# Patient Record
Sex: FEMALE | Race: WHITE | NOT HISPANIC OR LATINO | Employment: OTHER | URBAN - NONMETROPOLITAN AREA
[De-identification: names, ages, dates, MRNs, and addresses within clinical notes are randomized per-mention and may not be internally consistent; named-entity substitution may affect disease eponyms.]

---

## 2023-04-05 DIAGNOSIS — K22.70 BARRETT'S ESOPHAGUS WITHOUT DYSPLASIA: Primary | ICD-10-CM

## 2023-04-06 NOTE — TELEPHONE ENCOUNTER
Patient is due for 6 month follow up in April.   I called and spoke with patients spouse. Requested call back from patient

## 2023-04-07 NOTE — TELEPHONE ENCOUNTER
The patient is refusing to schedule at this time. She is going to the emergency room at this time for her arm.

## 2023-04-10 PROBLEM — K22.70 BARRETT'S ESOPHAGUS: Status: ACTIVE | Noted: 2023-04-10

## 2023-04-10 RX ORDER — OMEPRAZOLE 20 MG/1
20 CAPSULE, DELAYED RELEASE ORAL DAILY
Qty: 90 CAPSULE | Refills: 0 | Status: SHIPPED | OUTPATIENT
Start: 2023-04-10 | End: 2023-06-26

## 2023-04-10 RX ORDER — OMEPRAZOLE 20 MG/1
20 CAPSULE, DELAYED RELEASE ORAL DAILY
COMMUNITY
Start: 2016-07-05 | End: 2023-04-26 | Stop reason: ALTCHOICE

## 2023-04-13 ENCOUNTER — TELEPHONE (OUTPATIENT)
Dept: PRIMARY CARE | Facility: CLINIC | Age: 83
End: 2023-04-13
Payer: MEDICARE

## 2023-04-13 DIAGNOSIS — M25.519 ACUTE SHOULDER PAIN, UNSPECIFIED LATERALITY: Primary | ICD-10-CM

## 2023-04-13 RX ORDER — OXYCODONE HYDROCHLORIDE 5 MG/1
5 TABLET ORAL EVERY 6 HOURS PRN
Qty: 6 TABLET | Refills: 0 | Status: SHIPPED | OUTPATIENT
Start: 2023-04-13 | End: 2023-04-15 | Stop reason: SDUPTHER

## 2023-04-13 NOTE — TELEPHONE ENCOUNTER
[place] [facility] [place] a [place] and hurt shoulder calling to sched hosp follow up, no appointments available till Sat with Dr Oneil, pt is scheduled and asking if poss to get a couple hydrocodone called in till her apt on Sat morning?

## 2023-04-14 ENCOUNTER — APPOINTMENT (OUTPATIENT)
Dept: PRIMARY CARE | Facility: CLINIC | Age: 83
End: 2023-04-14
Payer: MEDICARE

## 2023-04-14 NOTE — PROGRESS NOTES
Subjective   Patient ID: Giselle Escobar is a 82 y.o. female who presents for a Hospital discharge follow up (ED x 2)   Son, Gamaliel, is present at today's appointment.     HPI   Location: [facility] ED  Date: 4/7 and 4/11/2023  Reason: Fall, left arm pain  Dx: Left shoulder injury, Fall, Shoulder strain     Patient fell while vacuuming the day prior to 4/11 presentation. Has Afib (takes xarelto). Took Percocet which helped. Did not have LOC or hit head.     Xray LEFT shoulder- arthritic changes, no fracture  Xray Left scapula- no fracture     4/7 Injury: Was moving her cat and had right arm pain after doing this activity. No fall or trauma. Developed pain at right elbow.   Dx: Right elbow pain, right arm pain, elbow sprain  Xray right elbow- OA with ossified bodies and joint effusion  Xray right forearm- no acute findings    She is referred to Ortho, seeing [facility] Dr.Patrick Brothers    Pain is rated as 10/10  Has some numbness and tingling into her hand intermittently.  Right hand dominant    Has been getting cortisone shots in her left shoulder chronically. Last was in the Fall 2022.     She is taking hydrocodone every 4 hours, but only has 2 pills left. She reports she is tolerating this well, no issues. Denies drowsiness or constipation from the medication.   She is also icing her shoulder.    She is doing some shoulder exercises, but has been wearing a sling.  Denies shoulder bruising or swelling    Review of Systems  See HPI    Objective   /75 (BP Location: Right arm, Patient Position: Sitting, BP Cuff Size: Adult)   Pulse 90   Temp 36.7 °C (98 °F) (Temporal)   Resp 16   Wt 75.8 kg (167 lb 1.6 oz)   SpO2 95%   BMI 29.60 kg/m²     Physical Exam  Musculoskeletal:      Left shoulder: Tenderness present. No swelling, effusion or crepitus. Decreased range of motion. Decreased strength. Normal pulse.      Comments: Left arm in sling, patient can passively flex shoulder and internally/externally rotate but  "can't actively perform these motions due to pain  Normal  strength  No ecchymosis or skin abnormalities appreciated        Constitutional: Well developed, well nourished, alert and in no acute distress   Eyes: Normal external exam.    Cardiovascular: Regular rate and rhythm, normal S1 and S2, no murmurs, gallops, or rubs. Radial pulses normal. No peripheral edema.  Pulmonary: No respiratory distress, lungs clear to auscultation bilaterally. No wheezes, rhonchi, rales.  Skin: Warm, well perfused, normal skin turgor and color.   Neurologic: Cranial nerves II-XII grossly intact.   Psychiatric: Mood calm and affect normal.      Assessment/Plan   ED records from 4/7 and 4/11 reviewed     [phone] [facility]    I personally have reviewed the RRS report for this patient.  This report is scanned into the electronic medical record.  I have considered the risks of abuse, dependence, addiction and diversion.  I believe that it is clinically appropriate for the patient to be prescribed this medication.    Referral to ortho placed, order given to the patient    We discussed the risks and potential adverse effects of the opiate pain medication and that this should be used sparingly. It may cause drowsiness and constipation.     Continue to do gentle arm stretches, we discussed avoiding \"frozen shoulder\"       "

## 2023-04-15 ENCOUNTER — OFFICE VISIT (OUTPATIENT)
Dept: PRIMARY CARE | Facility: CLINIC | Age: 83
End: 2023-04-15
Payer: MEDICARE

## 2023-04-15 VITALS
OXYGEN SATURATION: 95 % | TEMPERATURE: 98 F | HEART RATE: 90 BPM | WEIGHT: 167.1 LBS | BODY MASS INDEX: 29.6 KG/M2 | SYSTOLIC BLOOD PRESSURE: 131 MMHG | DIASTOLIC BLOOD PRESSURE: 75 MMHG | RESPIRATION RATE: 16 BRPM

## 2023-04-15 DIAGNOSIS — G89.29 CHRONIC LEFT SHOULDER PAIN: ICD-10-CM

## 2023-04-15 DIAGNOSIS — E66.3 OVERWEIGHT WITH BODY MASS INDEX (BMI) OF 29 TO 29.9 IN ADULT: ICD-10-CM

## 2023-04-15 DIAGNOSIS — M25.519 ACUTE SHOULDER PAIN, UNSPECIFIED LATERALITY: ICD-10-CM

## 2023-04-15 DIAGNOSIS — Z09 HOSPITAL DISCHARGE FOLLOW-UP: Primary | ICD-10-CM
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
## 2023-04-15 DIAGNOSIS — M25.512 CHRONIC LEFT SHOULDER PAIN: ICD-10-CM

## 2023-04-15 PROCEDURE — 1160F RVW MEDS BY RX/DR IN RCRD: CPT | Performed by: FAMILY MEDICINE

## 2023-04-15 PROCEDURE — 99214 OFFICE O/P EST MOD 30 MIN: CPT | Performed by: FAMILY MEDICINE

## 2023-04-15 PROCEDURE — 1159F MED LIST DOCD IN RCRD: CPT | Performed by: FAMILY MEDICINE

## 2023-04-15 PROCEDURE — 1036F TOBACCO NON-USER: CPT | Performed by: FAMILY MEDICINE

## 2023-04-15 RX ORDER — MULTIVITAMIN
TABLET ORAL
COMMUNITY
Start: 2009-06-08

## 2023-04-15 RX ORDER — FUROSEMIDE 20 MG/1
20 TABLET ORAL EVERY 12 HOURS
COMMUNITY
Start: 2022-06-09 | End: 2023-11-07 | Stop reason: ALTCHOICE

## 2023-04-15 RX ORDER — ASCORBIC ACID 500 MG
500 TABLET,CHEWABLE ORAL DAILY
COMMUNITY

## 2023-04-15 RX ORDER — LOSARTAN POTASSIUM 50 MG/1
TABLET ORAL EVERY 24 HOURS
COMMUNITY
Start: 2022-06-09 | End: 2023-06-26

## 2023-04-15 RX ORDER — CALCITRIOL 0.25 UG/1
CAPSULE ORAL
COMMUNITY
End: 2023-04-26 | Stop reason: SDUPTHER

## 2023-04-15 RX ORDER — BUDESONIDE 0.5 MG/2ML
0.5 INHALANT ORAL 2 TIMES DAILY
Qty: 120 ML | Refills: 11 | COMMUNITY
Start: 2022-08-01 | End: 2023-11-07 | Stop reason: ALTCHOICE

## 2023-04-15 RX ORDER — MULTIVITAMIN
TABLET ORAL EVERY 24 HOURS
COMMUNITY

## 2023-04-15 RX ORDER — MONTELUKAST SODIUM 10 MG/1
1 TABLET ORAL NIGHTLY
COMMUNITY
Start: 2022-05-20 | End: 2023-04-26 | Stop reason: ALTCHOICE

## 2023-04-15 RX ORDER — OXYCODONE HYDROCHLORIDE 5 MG/1
5 TABLET ORAL EVERY 6 HOURS PRN
Qty: 16 TABLET | Refills: 0 | Status: SHIPPED | OUTPATIENT
Start: 2023-04-15 | End: 2023-04-26 | Stop reason: ALTCHOICE

## 2023-04-15 RX ORDER — MAGNESIUM 250 MG
250 TABLET ORAL
COMMUNITY

## 2023-04-15 RX ORDER — OXYCODONE AND ACETAMINOPHEN 5; 325 MG/1; MG/1
TABLET ORAL
COMMUNITY
Start: 2023-04-07 | End: 2023-04-26 | Stop reason: ALTCHOICE

## 2023-04-15 ASSESSMENT — PAIN SCALES - GENERAL: PAINLEVEL: 10-WORST PAIN EVER

## 2023-04-15 NOTE — PATIENT INSTRUCTIONS
ED records from 4/7 and 4/11 reviewed     [phone] [facility]    I personally have reviewed the OARRS report for this patient.  This report is scanned into the electronic medical record.  I have considered the risks of abuse, dependence, addiction and diversion.  I believe that it is clinically appropriate for the patient to be prescribed this medication.    Referral to ortho placed, order given to the patient    We discussed the risks and potential adverse effects of the opiate pain medication and that this should be used sparingly. It may cause drowsiness and constipation.

## 2023-04-25 PROBLEM — M25.539 WRIST PAIN: Status: ACTIVE | Noted: 2023-04-25

## 2023-04-25 PROBLEM — E66.811 OBESITY, CLASS I, BMI 30-34.9: Status: ACTIVE | Noted: 2022-05-20

## 2023-04-25 PROBLEM — M19.012 GLENOHUMERAL ARTHRITIS, LEFT: Status: ACTIVE | Noted: 2023-04-25

## 2023-04-25 PROBLEM — N18.30 STAGE 3 CHRONIC KIDNEY DISEASE (MULTI): Status: RESOLVED | Noted: 2023-04-25 | Resolved: 2023-04-25

## 2023-04-25 PROBLEM — N18.30 STAGE 3 CHRONIC KIDNEY DISEASE (MULTI): Status: ACTIVE | Noted: 2023-04-25

## 2023-04-25 PROBLEM — L03.90 WOUND CELLULITIS: Status: RESOLVED | Noted: 2023-04-25 | Resolved: 2023-04-25

## 2023-04-25 PROBLEM — T14.90XA TRAUMA: Status: RESOLVED | Noted: 2020-12-02 | Resolved: 2023-04-25

## 2023-04-25 PROBLEM — N25.81 HYPERPARATHYROIDISM DUE TO RENAL INSUFFICIENCY (MULTI): Status: ACTIVE | Noted: 2023-04-25

## 2023-04-25 PROBLEM — N18.4 STAGE 4 CHRONIC KIDNEY DISEASE (MULTI): Status: ACTIVE | Noted: 2023-04-25

## 2023-04-25 PROBLEM — E83.42 HYPOMAGNESEMIA: Status: ACTIVE | Noted: 2023-04-25

## 2023-04-25 PROBLEM — S80.812A ABRASION OF LEFT LOWER LEG WITH INFECTION: Status: ACTIVE | Noted: 2022-05-17

## 2023-04-25 PROBLEM — D64.9 CHRONIC ANEMIA: Status: ACTIVE | Noted: 2023-04-25

## 2023-04-25 PROBLEM — M85.80 OSTEOPENIA: Status: ACTIVE | Noted: 2023-04-25

## 2023-04-25 PROBLEM — H61.22 IMPACTED CERUMEN OF LEFT EAR: Status: ACTIVE | Noted: 2023-04-25

## 2023-04-25 PROBLEM — J45.909 ALLERGY-INDUCED ASTHMA (HHS-HCC): Status: ACTIVE | Noted: 2023-04-25

## 2023-04-25 PROBLEM — H91.90 HEARING DIFFICULTY: Status: ACTIVE | Noted: 2023-04-25

## 2023-04-25 PROBLEM — R60.0 BILATERAL EDEMA OF LOWER EXTREMITY: Status: ACTIVE | Noted: 2023-04-25

## 2023-04-25 PROBLEM — M25.512 PAIN IN LEFT SHOULDER: Status: ACTIVE | Noted: 2023-04-25

## 2023-04-25 PROBLEM — I27.0 PRIMARY PULMONARY HYPERTENSION (MULTI): Status: ACTIVE | Noted: 2023-04-25

## 2023-04-25 PROBLEM — H61.22 IMPACTED CERUMEN OF LEFT EAR: Status: RESOLVED | Noted: 2023-04-25 | Resolved: 2023-04-25

## 2023-04-25 PROBLEM — K44.9 HIATAL HERNIA: Status: ACTIVE | Noted: 2023-04-25

## 2023-04-25 PROBLEM — I25.10 CAD (CORONARY ARTERY DISEASE): Status: ACTIVE | Noted: 2023-04-25

## 2023-04-25 PROBLEM — I07.1 TRICUSPID REGURGITATION: Status: ACTIVE | Noted: 2023-04-25

## 2023-04-25 PROBLEM — I65.29 CAROTID ARTERY OCCLUSION: Status: ACTIVE | Noted: 2023-04-25

## 2023-04-25 PROBLEM — R53.83 FATIGUE: Status: ACTIVE | Noted: 2023-04-25

## 2023-04-25 PROBLEM — S49.90XA SHOULDER INJURY: Status: ACTIVE | Noted: 2023-04-25

## 2023-04-25 PROBLEM — L03.90 WOUND CELLULITIS: Status: ACTIVE | Noted: 2023-04-25

## 2023-04-25 PROBLEM — L03.116 LEFT LEG CELLULITIS: Status: ACTIVE | Noted: 2022-05-18

## 2023-04-25 PROBLEM — R05.3 CHRONIC COUGH: Status: ACTIVE | Noted: 2022-05-18

## 2023-04-25 PROBLEM — I12.9 HYPERTENSION WITH RENAL DISEASE: Status: ACTIVE | Noted: 2023-04-25

## 2023-04-25 PROBLEM — R60.9 EDEMA: Status: ACTIVE | Noted: 2023-04-25

## 2023-04-25 PROBLEM — E66.811 OBESITY, CLASS I, BMI 30-34.9: Status: RESOLVED | Noted: 2022-05-20 | Resolved: 2023-04-25

## 2023-04-25 PROBLEM — R53.83 FATIGUE: Status: RESOLVED | Noted: 2023-04-25 | Resolved: 2023-04-25

## 2023-04-25 PROBLEM — I12.9 HYPERTENSIVE NEPHROSCLEROSIS: Status: ACTIVE | Noted: 2023-04-25

## 2023-04-25 PROBLEM — S63.8X2A TEAR OF LEFT SCAPHOLUNATE LIGAMENT: Status: ACTIVE | Noted: 2023-04-25

## 2023-04-25 PROBLEM — J45.909 REACTIVE AIRWAY DISEASE WITHOUT COMPLICATION (HHS-HCC): Status: ACTIVE | Noted: 2022-05-20

## 2023-04-25 PROBLEM — I35.0 AORTIC STENOSIS, MILD: Status: ACTIVE | Noted: 2023-04-25

## 2023-04-25 PROBLEM — T14.90XA TRAUMA: Status: ACTIVE | Noted: 2020-12-02

## 2023-04-25 PROBLEM — E66.9 OBESITY, CLASS I, BMI 30-34.9: Status: ACTIVE | Noted: 2022-05-20

## 2023-04-25 PROBLEM — S59.909A ELBOW INJURY: Status: ACTIVE | Noted: 2023-04-25

## 2023-04-25 PROBLEM — E66.9 OBESITY (BMI 30-39.9): Status: ACTIVE | Noted: 2023-04-25

## 2023-04-25 PROBLEM — L08.9 ABRASION OF LEFT LOWER LEG WITH INFECTION: Status: ACTIVE | Noted: 2022-05-17

## 2023-04-25 PROBLEM — I48.0 PAROXYSMAL ATRIAL FIBRILLATION (MULTI): Status: ACTIVE | Noted: 2022-05-18

## 2023-04-25 PROBLEM — E66.9 OBESITY, CLASS I, BMI 30-34.9: Status: RESOLVED | Noted: 2022-05-20 | Resolved: 2023-04-25

## 2023-04-25 PROBLEM — M79.643 PAIN OF HAND AFTER TRAUMA: Status: ACTIVE | Noted: 2023-04-25

## 2023-04-25 PROBLEM — D46.9 MYELODYSPLASTIC SYNDROME (MULTI): Status: ACTIVE | Noted: 2023-04-25

## 2023-04-25 RX ORDER — LANOLIN ALCOHOL/MO/W.PET/CERES
100 CREAM (GRAM) TOPICAL DAILY
COMMUNITY

## 2023-04-25 RX ORDER — IPRATROPIUM BROMIDE AND ALBUTEROL SULFATE 2.5; .5 MG/3ML; MG/3ML
SOLUTION RESPIRATORY (INHALATION)
COMMUNITY
Start: 2022-08-17 | End: 2023-11-07 | Stop reason: ALTCHOICE

## 2023-04-25 RX ORDER — ASCORBIC ACID 500 MG
500 TABLET ORAL DAILY
COMMUNITY
End: 2023-04-26 | Stop reason: ALTCHOICE

## 2023-04-25 NOTE — PROGRESS NOTES
Subjective   Reason for Visit: Giselle Escobar is an 82 y.o. female here for a Medicare Wellness visit.     Past Medical, Surgical, and Family History reviewed and updated in chart.    Reviewed all medications by prescribing practitioner or clinical pharmacist (such as prescriptions, OTCs, herbal therapies and supplements) and documented in the medical record.    HPI    Here for follow up-      A fib, CAD- follows with cardiology- Dr. De La Rosa/Shabnam Carranza- she is on xarelto. No bleeding concerns. Note from 10/7/22 visit reviewed. She has refused to take metoprolol.  Scheduled 5/8/23 for follow up.    Cath 2017- recommend medical management of CAD   She was on a statin years ago and stopped it due to myalgias   No hx MI or CHF   Some LE edema, L>R   Only uses lasix if her legs start swelling    Echo showed normal EF, patient in A fib, mild to moderate TR noted - June 2022      Has CKD stage 4- Dr. Quentin Marte - on losartan   She tries to use OTC compression stockings but has difficulty tolerating them, tried prescription ones in the past without tolerance   Has known secondary hyperparathyroidism and is on calcitriol   She is following up with one of his partners this next month      Hematology- Dr. Macdonald- MDS- chronic anemia- Hb ~11- last seen 10/4/22  Takes b12, vit D, calcium, multivitamin      GERD, hx Barretts- EGD 2022 neg - Dr. Rojas - on omeprazole     HTN- losartan- well controlled   Not checking at home      Osteopenia- DEXA 2019  Taking calcium and vit D     She recently saw Dr. Oneil for ER follow up x 2 - she fell and injured her shoulder and LUE   Has known Advanced L GH OA, L acromioclavicular OA  Xray shoulder was neg for fracture  She saw Dr. Noguera (ortho) on 4/18 - diagnosed her with wrist sprain, braced it, referred her to OT ; dx tear of left scapholunate ligament   She then saw Dr. Nolasco (ortho) on 4/21- had L shoulder injection and referred her to PT  She then saw Dr. Reid (ortho) 4/24- gave her  "medrol dose pack   She continues to ice it ; continues to have some to have swelling of wrist and hand and goes up arm (has improved), no redness or warmth  Most of pain in wrist but also shoots up arm and is in shoulder  Steroids are helping and she is not currently taking narcotics.  Pain now 4/10.  She is not sleeping well at night due to pain and would like a short course of percocet.    Son brought her today.   Starts PT Thursday.         Patient Care Team:  Makenna Zafar DO as PCP - General  [facility] JESSICA Macdonald MD as Consulting Physician (Hematology and Oncology)  Stevenson Mcnally MD as Consulting Physician (Nephrology)  Johnson Rojas MD as Referring Physician (Gastroenterology)  Dev De La Rosa MD as Consulting Physician (Cardiology)     Review of Systems   Constitutional:  Negative for chills, fatigue and fever.   HENT:  Negative for congestion, ear pain and sore throat.    Eyes:  Negative for visual disturbance.   Respiratory:  Negative for cough and shortness of breath.    Cardiovascular:  Negative for chest pain, palpitations and leg swelling.   Gastrointestinal:  Negative for abdominal pain, constipation, diarrhea, nausea and vomiting.   Genitourinary: Negative.    Musculoskeletal:  Positive for arthralgias and myalgias.   Skin:  Negative for rash.   Neurological:  Negative for dizziness, weakness, numbness and headaches.   Psychiatric/Behavioral: Negative.         Objective   Vitals:  /79 (BP Location: Right arm, Patient Position: Sitting, BP Cuff Size: Adult)   Pulse 73   Temp 36.3 °C (97.3 °F) (Temporal)   Resp 16   Ht 1.613 m (5' 3.5\")   Wt 79.1 kg (174 lb 6.4 oz)   SpO2 95%   BMI 30.41 kg/m²       Physical Exam    Constitutional: Well developed, well nourished, alert and in no acute distress.  Head and Face: Normocephalic, atraumatic.  Eyes: Normal external exam. Pupils equally round and reactive to light with normal accommodation and extraocular movements intact. "   ENT: External inspection of ears normal, tympanic membranes visualized and normal.   Neck: Supple, no lymphadenopathy or masses.   Cardiovascular: Irregular rate and rhythm, normal S1 and S2, no murmurs, gallops, or rubs.  Bilateral symmetric 1+ peripheral edema to mid calf. No carotid bruits.   Pulmonary: No respiratory distress, lungs clear to auscultation bilaterally. No wheezes, rhonchi, rales.   SHOULDER: Normal visual inspection, no erythema, warmth, or swelling. Severely reduced range of motion, can abduct to about 30 degrees.    Hands: Some L wrist swelling present without erythema or warmth. Sensation intact. Radial pulses intact. ROM mildly reduced.    Skin: Warm, well perfused, normal skin turgor and color, no lesions or rashes noted.   Neurologic: Cranial nerves II-XII grossly intact.  Psychiatric: Mood calm and affect normal.      Assessment/Plan   Problem List Items Addressed This Visit          Circulatory    CAD (coronary artery disease)    Current Assessment & Plan     Follow up with cardiology   Statin intolerant   On xarelto for A fib          Essential hypertension, benign    Current Assessment & Plan     Controlled, continue losartan          Paroxysmal atrial fibrillation (CMS/[facility])    Current Assessment & Plan     Continue xarelto for stroke prevention             Digestive    Brunson's esophagus    Overview     EGD 7/6/22         Current Assessment & Plan     Continue omeprazole - Dr. Rojas          Gastroesophageal reflux disease       Genitourinary    Stage 4 chronic kidney disease (CMS/[facility])    Current Assessment & Plan     Follows with Nephrology - labs ordered today          Relevant Medications    calcitriol (Rocaltrol) 0.25 mcg capsule    Other Relevant Orders    Basic Metabolic Panel    Parathyroid Hormone, Intact    Vitamin D, Total    Phosphorus       Musculoskeletal    Glenohumeral arthritis, left    Osteopenia    Overview     5/14/19 dexa         Current Assessment & Plan     Bone  health support: Make sure you are getting at least 1,200 mg daily of calcium (preferred via dietary intake, okay for supplements if needed), and 2,000 IU of vitamin D3 daily.   Encourage weight bearing exercise as able, along with balance and strength training  Reduce fall risk in the home           Pain in left shoulder    Current Assessment & Plan     Acute on chronic pain, underlying severe GH OA with recent fall  S/p L shoulder injection with ortho   Currently on medrol dose pack as well   Starting PT this week   Advised her I would refill the percocet short term only, no refills to be given, so she can get some sleep at night  Plan reviewed and patient indicates understanding of plan of care and medication use  I discussed proper medication secure storage and disposal of unused medications with the patient.   It is unsafe and unlawful to give away or sell opioid medication.   Patient counseled of risks of opioids including but not limited to sedation, drowsiness, irregular heart rate, constipation, nausea, itching, vomiting, dizziness, slowing of breathing rate, slowing of reaction time, increased sensitivity to pain, decreases in hormone levels in body, physical dependence, tolerance to medication, addiction, depression, and death. Patient states understanding of these risks and wishes to proceed with treatment. Advised no concurrent alcohol use or sleep aids with this medication. Do not use before driving.  An OARRS report, the list of controlled substances dispensed for the patient, has been updated according to requirements from the [facility] and is concordant.            Pain of hand after trauma    Wrist pain    Shoulder injury    Relevant Medications    oxyCODONE-acetaminophen (Percocet) 5-325 mg tablet    Tear of left scapholunate ligament       Endocrine/Metabolic    Hyperparathyroidism due to renal insufficiency (CMS/[facility])    Class 1 obesity without serious comorbidity with body mass index (BMI) of  30.0 to 30.9 in adult       Hematologic    Chronic anemia       Other    Hyperlipidemia    Myelodysplastic syndrome (CMS/HCC)    Current Assessment & Plan     Follow up with Dr. Macdonald          Relevant Orders    CBC and Auto Differential    Ferritin    Iron and TIBC    Folate    Vitamin B12     Other Visit Diagnoses       Medicare annual wellness visit, subsequent    -  Primary    Screening for depression        Routine general medical examination at health care facility              Makenna Zafar DO  4/26/2023

## 2023-04-26 ENCOUNTER — LAB (OUTPATIENT)
Dept: LAB | Facility: LAB | Age: 83
End: 2023-04-26
Payer: MEDICARE

## 2023-04-26 ENCOUNTER — OFFICE VISIT (OUTPATIENT)
Dept: PRIMARY CARE | Facility: CLINIC | Age: 83
End: 2023-04-26
Payer: MEDICARE

## 2023-04-26 VITALS
SYSTOLIC BLOOD PRESSURE: 131 MMHG | DIASTOLIC BLOOD PRESSURE: 79 MMHG | HEART RATE: 73 BPM | OXYGEN SATURATION: 95 % | BODY MASS INDEX: 29.78 KG/M2 | TEMPERATURE: 97.3 F | WEIGHT: 174.4 LBS | HEIGHT: 64 IN | RESPIRATION RATE: 16 BRPM

## 2023-04-26 DIAGNOSIS — S49.92XD INJURY OF LEFT SHOULDER, SUBSEQUENT ENCOUNTER: ICD-10-CM

## 2023-04-26 DIAGNOSIS — M25.532 LEFT WRIST PAIN: ICD-10-CM

## 2023-04-26 DIAGNOSIS — D64.9 CHRONIC ANEMIA: ICD-10-CM

## 2023-04-26 DIAGNOSIS — E66.9 CLASS 1 OBESITY WITHOUT SERIOUS COMORBIDITY WITH BODY MASS INDEX (BMI) OF 30.0 TO 30.9 IN ADULT, UNSPECIFIED OBESITY TYPE: ICD-10-CM

## 2023-04-26 DIAGNOSIS — K21.9 GASTROESOPHAGEAL REFLUX DISEASE, UNSPECIFIED WHETHER ESOPHAGITIS PRESENT: ICD-10-CM

## 2023-04-26 DIAGNOSIS — N25.81 HYPERPARATHYROIDISM DUE TO RENAL INSUFFICIENCY (MULTI): ICD-10-CM

## 2023-04-26 DIAGNOSIS — N18.4 STAGE 4 CHRONIC KIDNEY DISEASE (MULTI): ICD-10-CM

## 2023-04-26 DIAGNOSIS — Z00.00 MEDICARE ANNUAL WELLNESS VISIT, SUBSEQUENT: Primary | ICD-10-CM

## 2023-04-26 DIAGNOSIS — I25.10 CORONARY ARTERY DISEASE INVOLVING NATIVE CORONARY ARTERY OF NATIVE HEART WITHOUT ANGINA PECTORIS: ICD-10-CM

## 2023-04-26 DIAGNOSIS — Z13.31 SCREENING FOR DEPRESSION: ICD-10-CM

## 2023-04-26 DIAGNOSIS — M79.643 PAIN OF HAND AFTER TRAUMA: ICD-10-CM

## 2023-04-26 DIAGNOSIS — I48.0 PAROXYSMAL ATRIAL FIBRILLATION (MULTI): ICD-10-CM

## 2023-04-26 DIAGNOSIS — M85.80 OSTEOPENIA, UNSPECIFIED LOCATION: ICD-10-CM

## 2023-04-26 DIAGNOSIS — S63.8X2D TEAR OF LEFT SCAPHOLUNATE LIGAMENT, SUBSEQUENT ENCOUNTER: ICD-10-CM

## 2023-04-26 DIAGNOSIS — M19.012 GLENOHUMERAL ARTHRITIS, LEFT: ICD-10-CM

## 2023-04-26 DIAGNOSIS — Z00.00 ROUTINE GENERAL MEDICAL EXAMINATION AT HEALTH CARE FACILITY: ICD-10-CM

## 2023-04-26 DIAGNOSIS — M25.512 CHRONIC LEFT SHOULDER PAIN: ICD-10-CM

## 2023-04-26 DIAGNOSIS — K22.70 BARRETT'S ESOPHAGUS WITHOUT DYSPLASIA: ICD-10-CM

## 2023-04-26 DIAGNOSIS — E78.5 HYPERLIPIDEMIA, UNSPECIFIED HYPERLIPIDEMIA TYPE: ICD-10-CM

## 2023-04-26 DIAGNOSIS — I10 ESSENTIAL HYPERTENSION, BENIGN: ICD-10-CM

## 2023-04-26 DIAGNOSIS — D46.9 MYELODYSPLASTIC SYNDROME (MULTI): ICD-10-CM

## 2023-04-26 DIAGNOSIS — G89.29 CHRONIC LEFT SHOULDER PAIN: ICD-10-CM

## 2023-04-26 PROBLEM — I65.29 CAROTID ARTERY OCCLUSION: Status: RESOLVED | Noted: 2023-04-25 | Resolved: 2023-04-26

## 2023-04-26 PROBLEM — L03.116 LEFT LEG CELLULITIS: Status: RESOLVED | Noted: 2022-05-18 | Resolved: 2023-04-26

## 2023-04-26 PROBLEM — R60.9 EDEMA: Status: RESOLVED | Noted: 2023-04-25 | Resolved: 2023-04-26

## 2023-04-26 PROBLEM — E83.42 HYPOMAGNESEMIA: Status: RESOLVED | Noted: 2023-04-25 | Resolved: 2023-04-26

## 2023-04-26 LAB
BASOPHILS (10*3/UL) IN BLOOD BY AUTOMATED COUNT: 0.02 X10E9/L (ref 0–0.1)
BASOPHILS/100 LEUKOCYTES IN BLOOD BY AUTOMATED COUNT: 0.2 % (ref 0–2)
EOSINOPHILS (10*3/UL) IN BLOOD BY AUTOMATED COUNT: 0.02 X10E9/L (ref 0–0.4)
EOSINOPHILS/100 LEUKOCYTES IN BLOOD BY AUTOMATED COUNT: 0.2 % (ref 0–6)
ERYTHROCYTE DISTRIBUTION WIDTH (RATIO) BY AUTOMATED COUNT: 15.1 % (ref 11.5–14.5)
ERYTHROCYTE MEAN CORPUSCULAR HEMOGLOBIN CONCENTRATION (G/DL) BY AUTOMATED: 29.4 G/DL (ref 32–36)
ERYTHROCYTE MEAN CORPUSCULAR VOLUME (FL) BY AUTOMATED COUNT: 99 FL (ref 80–100)
ERYTHROCYTES (10*6/UL) IN BLOOD BY AUTOMATED COUNT: 3.7 X10E12/L (ref 4–5.2)
HEMATOCRIT (%) IN BLOOD BY AUTOMATED COUNT: 36.7 % (ref 36–46)
HEMOGLOBIN (G/DL) IN BLOOD: 10.8 G/DL (ref 12–16)
IMMATURE GRANULOCYTES/100 LEUKOCYTES IN BLOOD BY AUTOMATED COUNT: 0.6 % (ref 0–0.9)
LEUKOCYTES (10*3/UL) IN BLOOD BY AUTOMATED COUNT: 12.8 X10E9/L (ref 4.4–11.3)
LYMPHOCYTES (10*3/UL) IN BLOOD BY AUTOMATED COUNT: 2.07 X10E9/L (ref 0.8–3)
LYMPHOCYTES/100 LEUKOCYTES IN BLOOD BY AUTOMATED COUNT: 16.1 % (ref 13–44)
MONOCYTES (10*3/UL) IN BLOOD BY AUTOMATED COUNT: 0.95 X10E9/L (ref 0.05–0.8)
MONOCYTES/100 LEUKOCYTES IN BLOOD BY AUTOMATED COUNT: 7.4 % (ref 2–10)
NEUTROPHILS (10*3/UL) IN BLOOD BY AUTOMATED COUNT: 9.69 X10E9/L (ref 1.6–5.5)
NEUTROPHILS/100 LEUKOCYTES IN BLOOD BY AUTOMATED COUNT: 75.5 % (ref 40–80)
NRBC (PER 100 WBCS) BY AUTOMATED COUNT: 0 /100 WBC (ref 0–0)
PLATELETS (10*3/UL) IN BLOOD AUTOMATED COUNT: 421 X10E9/L (ref 150–450)

## 2023-04-26 PROCEDURE — G0444 DEPRESSION SCREEN ANNUAL: HCPCS | Performed by: FAMILY MEDICINE

## 2023-04-26 PROCEDURE — 82728 ASSAY OF FERRITIN: CPT

## 2023-04-26 PROCEDURE — 82746 ASSAY OF FOLIC ACID SERUM: CPT

## 2023-04-26 PROCEDURE — 1036F TOBACCO NON-USER: CPT | Performed by: FAMILY MEDICINE

## 2023-04-26 PROCEDURE — 3078F DIAST BP <80 MM HG: CPT | Performed by: FAMILY MEDICINE

## 2023-04-26 PROCEDURE — G0439 PPPS, SUBSEQ VISIT: HCPCS | Performed by: FAMILY MEDICINE

## 2023-04-26 PROCEDURE — 1159F MED LIST DOCD IN RCRD: CPT | Performed by: FAMILY MEDICINE

## 2023-04-26 PROCEDURE — 80048 BASIC METABOLIC PNL TOTAL CA: CPT

## 2023-04-26 PROCEDURE — 1170F FXNL STATUS ASSESSED: CPT | Performed by: FAMILY MEDICINE

## 2023-04-26 PROCEDURE — 83540 ASSAY OF IRON: CPT

## 2023-04-26 PROCEDURE — 3075F SYST BP GE 130 - 139MM HG: CPT | Performed by: FAMILY MEDICINE

## 2023-04-26 PROCEDURE — 83970 ASSAY OF PARATHORMONE: CPT

## 2023-04-26 PROCEDURE — 82607 VITAMIN B-12: CPT

## 2023-04-26 PROCEDURE — 84100 ASSAY OF PHOSPHORUS: CPT

## 2023-04-26 PROCEDURE — 83550 IRON BINDING TEST: CPT

## 2023-04-26 PROCEDURE — 85025 COMPLETE CBC W/AUTO DIFF WBC: CPT

## 2023-04-26 PROCEDURE — 99214 OFFICE O/P EST MOD 30 MIN: CPT | Performed by: FAMILY MEDICINE

## 2023-04-26 PROCEDURE — 82306 VITAMIN D 25 HYDROXY: CPT

## 2023-04-26 PROCEDURE — 1160F RVW MEDS BY RX/DR IN RCRD: CPT | Performed by: FAMILY MEDICINE

## 2023-04-26 PROCEDURE — 36415 COLL VENOUS BLD VENIPUNCTURE: CPT

## 2023-04-26 RX ORDER — OXYCODONE AND ACETAMINOPHEN 5; 325 MG/1; MG/1
1 TABLET ORAL EVERY 8 HOURS PRN
Qty: 15 TABLET | Refills: 0 | Status: SHIPPED | OUTPATIENT
Start: 2023-04-26 | End: 2023-04-26 | Stop reason: SDUPTHER

## 2023-04-26 RX ORDER — CALCITRIOL 0.25 UG/1
CAPSULE ORAL
Qty: 45 CAPSULE | Refills: 3 | Status: SHIPPED | OUTPATIENT
Start: 2023-04-26

## 2023-04-26 RX ORDER — CALCITRIOL 0.25 UG/1
CAPSULE ORAL
Qty: 45 CAPSULE | Refills: 3 | Status: SHIPPED | OUTPATIENT
Start: 2023-04-26 | End: 2023-04-26 | Stop reason: SDUPTHER

## 2023-04-26 RX ORDER — OXYCODONE AND ACETAMINOPHEN 5; 325 MG/1; MG/1
1 TABLET ORAL EVERY 8 HOURS PRN
Qty: 15 TABLET | Refills: 0 | Status: SHIPPED | OUTPATIENT
Start: 2023-04-26 | End: 2023-09-11 | Stop reason: WASHOUT

## 2023-04-26 ASSESSMENT — LIFESTYLE VARIABLES
SKIP TO QUESTIONS 9-10: 1
HOW MANY STANDARD DRINKS CONTAINING ALCOHOL DO YOU HAVE ON A TYPICAL DAY: PATIENT DOES NOT DRINK
HOW OFTEN DURING THE LAST YEAR HAVE YOU BEEN UNABLE TO REMEMBER WHAT HAPPENED THE NIGHT BEFORE BECAUSE YOU HAD BEEN DRINKING: NEVER
HOW OFTEN DURING THE LAST YEAR HAVE YOU NEEDED AN ALCOHOLIC DRINK FIRST THING IN THE MORNING TO GET YOURSELF GOING AFTER A NIGHT OF HEAVY DRINKING: NEVER
AUDIT-C TOTAL SCORE: 0
HOW OFTEN DURING THE LAST YEAR HAVE YOU HAD A FEELING OF GUILT OR REMORSE AFTER DRINKING: NEVER
HOW OFTEN DO YOU HAVE SIX OR MORE DRINKS ON ONE OCCASION: NEVER
AUDIT TOTAL SCORE: 0
HOW OFTEN DURING THE LAST YEAR HAVE YOU FAILED TO DO WHAT WAS NORMALLY EXPECTED FROM YOU BECAUSE OF DRINKING: NEVER
HAS A RELATIVE, FRIEND, DOCTOR, OR ANOTHER HEALTH PROFESSIONAL EXPRESSED CONCERN ABOUT YOUR DRINKING OR SUGGESTED YOU CUT DOWN: NO
HOW OFTEN DO YOU HAVE A DRINK CONTAINING ALCOHOL: NEVER
HOW OFTEN DURING THE LAST YEAR HAVE YOU FOUND THAT YOU WERE NOT ABLE TO STOP DRINKING ONCE YOU HAD STARTED: NEVER
HAVE YOU OR SOMEONE ELSE BEEN INJURED AS A RESULT OF YOUR DRINKING: NO

## 2023-04-26 ASSESSMENT — ENCOUNTER SYMPTOMS
VOMITING: 0
NAUSEA: 0
ARTHRALGIAS: 1
COUGH: 0
FEVER: 0
DIZZINESS: 0
DIARRHEA: 0
ABDOMINAL PAIN: 0
HEADACHES: 0
WEAKNESS: 0
CHILLS: 0
CONSTIPATION: 0
MYALGIAS: 1
SORE THROAT: 0
PALPITATIONS: 0
NUMBNESS: 0
SHORTNESS OF BREATH: 0
FATIGUE: 0
PSYCHIATRIC NEGATIVE: 1

## 2023-04-26 ASSESSMENT — ACTIVITIES OF DAILY LIVING (ADL)
BATHING: NEEDS ASSISTANCE
DRESSING: NEEDS ASSISTANCE
GROCERY_SHOPPING: NEEDS ASSISTANCE
MANAGING_FINANCES: INDEPENDENT
TAKING_MEDICATION: INDEPENDENT
DOING_HOUSEWORK: NEEDS ASSISTANCE

## 2023-04-26 ASSESSMENT — PATIENT HEALTH QUESTIONNAIRE - PHQ9
2. FEELING DOWN, DEPRESSED OR HOPELESS: NOT AT ALL
1. LITTLE INTEREST OR PLEASURE IN DOING THINGS: NOT AT ALL
SUM OF ALL RESPONSES TO PHQ9 QUESTIONS 1 AND 2: 0

## 2023-04-26 NOTE — ASSESSMENT & PLAN NOTE
Acute on chronic pain, underlying severe GH OA with recent fall  S/p L shoulder injection with ortho   Currently on medrol dose pack as well   Starting PT this week   Advised her I would refill the percocet short term only, no refills to be given, so she can get some sleep at night  Plan reviewed and patient indicates understanding of plan of care and medication use  I discussed proper medication secure storage and disposal of unused medications with the patient.   It is unsafe and unlawful to give away or sell opioid medication.   Patient counseled of risks of opioids including but not limited to sedation, drowsiness, irregular heart rate, constipation, nausea, itching, vomiting, dizziness, slowing of breathing rate, slowing of reaction time, increased sensitivity to pain, decreases in hormone levels in body, physical dependence, tolerance to medication, addiction, depression, and death. Patient states understanding of these risks and wishes to proceed with treatment. Advised no concurrent alcohol use or sleep aids with this medication. Do not use before driving.  An OARRS report, the list of controlled substances dispensed for the patient, has been updated according to requirements from the [facility] and is concordant.

## 2023-04-26 NOTE — ASSESSMENT & PLAN NOTE
Bone health support: Make sure you are getting at least 1,200 mg daily of calcium (preferred via dietary intake, okay for supplements if needed), and 2,000 IU of vitamin D3 daily.   Encourage weight bearing exercise as able, along with balance and strength training  Reduce fall risk in the home

## 2023-04-27 LAB
ANION GAP IN SER/PLAS: 15 MMOL/L (ref 10–20)
CALCIDIOL (25 OH VITAMIN D3) (NG/ML) IN SER/PLAS: 65 NG/ML
CALCIUM (MG/DL) IN SER/PLAS: 9.6 MG/DL (ref 8.6–10.6)
CARBON DIOXIDE, TOTAL (MMOL/L) IN SER/PLAS: 23 MMOL/L (ref 21–32)
CHLORIDE (MMOL/L) IN SER/PLAS: 107 MMOL/L (ref 98–107)
COBALAMIN (VITAMIN B12) (PG/ML) IN SER/PLAS: >2000 PG/ML (ref 211–911)
CREATININE (MG/DL) IN SER/PLAS: 2.2 MG/DL (ref 0.5–1.05)
FERRITIN (UG/LL) IN SER/PLAS: 117 UG/L (ref 8–150)
FOLATE (NG/ML) IN SER/PLAS: >24 NG/ML
GFR FEMALE: 22 ML/MIN/1.73M2
GLUCOSE (MG/DL) IN SER/PLAS: 95 MG/DL (ref 74–99)
IRON (UG/DL) IN SER/PLAS: 54 UG/DL (ref 35–150)
IRON BINDING CAPACITY (UG/DL) IN SER/PLAS: 294 UG/DL (ref 240–445)
IRON SATURATION (%) IN SER/PLAS: 18 % (ref 25–45)
PARATHYRIN INTACT (PG/ML) IN SER/PLAS: 86.7 PG/ML (ref 18.5–88)
PHOSPHATE (MG/DL) IN SER/PLAS: 3.3 MG/DL (ref 2.5–4.9)
POTASSIUM (MMOL/L) IN SER/PLAS: 4.5 MMOL/L (ref 3.5–5.3)
SODIUM (MMOL/L) IN SER/PLAS: 140 MMOL/L (ref 136–145)
UREA NITROGEN (MG/DL) IN SER/PLAS: 46 MG/DL (ref 6–23)

## 2023-04-28 ENCOUNTER — TELEPHONE (OUTPATIENT)
Dept: PRIMARY CARE | Facility: CLINIC | Age: 83
End: 2023-04-28
Payer: MEDICARE

## 2023-04-28 DIAGNOSIS — G47.00 INSOMNIA, UNSPECIFIED TYPE: Primary | ICD-10-CM

## 2023-04-28 RX ORDER — TRAZODONE HYDROCHLORIDE 50 MG/1
50 TABLET ORAL NIGHTLY PRN
Qty: 30 TABLET | Refills: 0 | Status: SHIPPED | OUTPATIENT
Start: 2023-04-28 | End: 2023-05-31 | Stop reason: SDUPTHER

## 2023-04-28 NOTE — TELEPHONE ENCOUNTER
Patient wanted you to know she has follow up with nephrologist and Dr Macdonald.    She tried the medication you prescribed to help her sleep.  She did not find it helpful.  She tried melatonin last night and was able to get a few hours of sleep but not a significant amount.     She wants to know if there is any other medication you could send in/suggest to help?  She feels extremely worn down because of the lack of sleep.

## 2023-05-12 LAB
ALBUMIN (MG/L) IN URINE: 54.4 MG/L
ALBUMIN/CREATININE (UG/MG) IN URINE: 200.7 UG/MG CRT (ref 0–30)
CREATININE (MG/DL) IN URINE: 27.1 MG/DL (ref 20–320)

## 2023-05-16 LAB
BASOPHILS (10*3/UL) IN BLOOD BY AUTOMATED COUNT: 0.03 X10E9/L (ref 0–0.1)
BASOPHILS/100 LEUKOCYTES IN BLOOD BY AUTOMATED COUNT: 0.3 % (ref 0–2)
EOSINOPHILS (10*3/UL) IN BLOOD BY AUTOMATED COUNT: 0.2 X10E9/L (ref 0–0.4)
EOSINOPHILS/100 LEUKOCYTES IN BLOOD BY AUTOMATED COUNT: 2.2 % (ref 0–6)
ERYTHROCYTE DISTRIBUTION WIDTH (RATIO) BY AUTOMATED COUNT: 17.3 % (ref 11.5–14.5)
ERYTHROCYTE MEAN CORPUSCULAR HEMOGLOBIN CONCENTRATION (G/DL) BY AUTOMATED: 28.7 G/DL (ref 32–36)
ERYTHROCYTE MEAN CORPUSCULAR VOLUME (FL) BY AUTOMATED COUNT: 103 FL (ref 80–100)
ERYTHROCYTES (10*6/UL) IN BLOOD BY AUTOMATED COUNT: 4.15 X10E12/L (ref 4–5.2)
HEMATOCRIT (%) IN BLOOD BY AUTOMATED COUNT: 42.8 % (ref 36–46)
HEMOGLOBIN (G/DL) IN BLOOD: 12.3 G/DL (ref 12–16)
IMMATURE GRANULOCYTES/100 LEUKOCYTES IN BLOOD BY AUTOMATED COUNT: 0.8 % (ref 0–0.9)
LEUKOCYTES (10*3/UL) IN BLOOD BY AUTOMATED COUNT: 9.2 X10E9/L (ref 4.4–11.3)
LYMPHOCYTES (10*3/UL) IN BLOOD BY AUTOMATED COUNT: 2.36 X10E9/L (ref 0.8–3)
LYMPHOCYTES/100 LEUKOCYTES IN BLOOD BY AUTOMATED COUNT: 25.8 % (ref 13–44)
MONOCYTES (10*3/UL) IN BLOOD BY AUTOMATED COUNT: 0.48 X10E9/L (ref 0.05–0.8)
MONOCYTES/100 LEUKOCYTES IN BLOOD BY AUTOMATED COUNT: 5.2 % (ref 2–10)
NEUTROPHILS (10*3/UL) IN BLOOD BY AUTOMATED COUNT: 6.02 X10E9/L (ref 1.6–5.5)
NEUTROPHILS/100 LEUKOCYTES IN BLOOD BY AUTOMATED COUNT: 65.7 % (ref 40–80)
PLATELETS (10*3/UL) IN BLOOD AUTOMATED COUNT: 216 X10E9/L (ref 150–450)
RBC MORPHOLOGY IN BLOOD: NORMAL

## 2023-05-17 LAB
ALANINE AMINOTRANSFERASE (SGPT) (U/L) IN SER/PLAS: 21 U/L (ref 7–45)
ALBUMIN (G/DL) IN SER/PLAS: 3.8 G/DL (ref 3.4–5)
ALKALINE PHOSPHATASE (U/L) IN SER/PLAS: 86 U/L (ref 33–136)
ANION GAP IN SER/PLAS: 13 MMOL/L (ref 10–20)
ASPARTATE AMINOTRANSFERASE (SGOT) (U/L) IN SER/PLAS: 17 U/L (ref 9–39)
BILIRUBIN TOTAL (MG/DL) IN SER/PLAS: 0.4 MG/DL (ref 0–1.2)
CALCIUM (MG/DL) IN SER/PLAS: 9.6 MG/DL (ref 8.6–10.6)
CARBON DIOXIDE, TOTAL (MMOL/L) IN SER/PLAS: 25 MMOL/L (ref 21–32)
CHLORIDE (MMOL/L) IN SER/PLAS: 107 MMOL/L (ref 98–107)
COBALAMIN (VITAMIN B12) (PG/ML) IN SER/PLAS: 1030 PG/ML (ref 211–911)
CREATININE (MG/DL) IN SER/PLAS: 1.53 MG/DL (ref 0.5–1.05)
FERRITIN (UG/LL) IN SER/PLAS: 73 UG/L (ref 8–150)
GFR FEMALE: 33 ML/MIN/1.73M2
GLUCOSE (MG/DL) IN SER/PLAS: 83 MG/DL (ref 74–99)
IRON (UG/DL) IN SER/PLAS: 49 UG/DL (ref 35–150)
IRON BINDING CAPACITY (UG/DL) IN SER/PLAS: 359 UG/DL (ref 240–445)
IRON SATURATION (%) IN SER/PLAS: 14 % (ref 25–45)
POTASSIUM (MMOL/L) IN SER/PLAS: 4.1 MMOL/L (ref 3.5–5.3)
PROTEIN TOTAL: 6.8 G/DL (ref 6.4–8.2)
SODIUM (MMOL/L) IN SER/PLAS: 141 MMOL/L (ref 136–145)
UREA NITROGEN (MG/DL) IN SER/PLAS: 28 MG/DL (ref 6–23)

## 2023-05-19 DIAGNOSIS — G47.00 INSOMNIA, UNSPECIFIED TYPE: ICD-10-CM

## 2023-05-19 DIAGNOSIS — S49.92XD INJURY OF LEFT SHOULDER, SUBSEQUENT ENCOUNTER: ICD-10-CM

## 2023-05-19 LAB — COPPER: 117.3 UG/DL (ref 80–155)

## 2023-05-19 RX ORDER — OXYCODONE AND ACETAMINOPHEN 5; 325 MG/1; MG/1
1 TABLET ORAL EVERY 8 HOURS PRN
Qty: 15 TABLET | Refills: 0 | OUTPATIENT
Start: 2023-05-19

## 2023-05-19 NOTE — TELEPHONE ENCOUNTER
The patient is asking for more Percocet, she hurt her wrist recently.  Please send to pharmacy on file

## 2023-05-31 RX ORDER — TRAZODONE HYDROCHLORIDE 50 MG/1
50 TABLET ORAL NIGHTLY PRN
Qty: 90 TABLET | Refills: 1 | Status: SHIPPED | OUTPATIENT
Start: 2023-05-31 | End: 2023-09-11 | Stop reason: WASHOUT

## 2023-06-25 DIAGNOSIS — I12.9 HYPERTENSION WITH RENAL DISEASE: ICD-10-CM

## 2023-06-25 DIAGNOSIS — K22.70 BARRETT'S ESOPHAGUS WITHOUT DYSPLASIA: ICD-10-CM

## 2023-06-26 RX ORDER — OMEPRAZOLE 20 MG/1
20 CAPSULE, DELAYED RELEASE ORAL DAILY
Qty: 90 CAPSULE | Refills: 1 | Status: SHIPPED | OUTPATIENT
Start: 2023-06-26 | End: 2023-12-13

## 2023-06-26 RX ORDER — LOSARTAN POTASSIUM 50 MG/1
50 TABLET ORAL DAILY
Qty: 90 TABLET | Refills: 1 | Status: SHIPPED | OUTPATIENT
Start: 2023-06-26 | End: 2023-11-28 | Stop reason: ALTCHOICE

## 2023-08-21 LAB
ALBUMIN (G/DL) IN SER/PLAS: 4 G/DL (ref 3.4–5)
ANION GAP IN SER/PLAS: 16 MMOL/L (ref 10–20)
APPEARANCE, URINE: ABNORMAL
BACTERIA, URINE: ABNORMAL /HPF
BILIRUBIN, URINE: NEGATIVE
BLOOD, URINE: ABNORMAL
CALCIDIOL (25 OH VITAMIN D3) (NG/ML) IN SER/PLAS: 62 NG/ML
CALCIUM (MG/DL) IN SER/PLAS: 9.4 MG/DL (ref 8.6–10.6)
CALCIUM OXALATE CRYSTALS, URINE: ABNORMAL /HPF
CARBON DIOXIDE, TOTAL (MMOL/L) IN SER/PLAS: 17 MMOL/L (ref 21–32)
CHLORIDE (MMOL/L) IN SER/PLAS: 111 MMOL/L (ref 98–107)
COLOR, URINE: YELLOW
CREATININE (MG/DL) IN SER/PLAS: 1.84 MG/DL (ref 0.5–1.05)
CREATININE (MG/DL) IN URINE: 34.3 MG/DL (ref 20–320)
ERYTHROCYTE DISTRIBUTION WIDTH (RATIO) BY AUTOMATED COUNT: 15.4 % (ref 11.5–14.5)
ERYTHROCYTE MEAN CORPUSCULAR HEMOGLOBIN CONCENTRATION (G/DL) BY AUTOMATED: 29.4 G/DL (ref 32–36)
ERYTHROCYTE MEAN CORPUSCULAR VOLUME (FL) BY AUTOMATED COUNT: 102 FL (ref 80–100)
ERYTHROCYTES (10*6/UL) IN BLOOD BY AUTOMATED COUNT: 4.03 X10E12/L (ref 4–5.2)
GFR FEMALE: 27 ML/MIN/1.73M2
GLUCOSE (MG/DL) IN SER/PLAS: 77 MG/DL (ref 74–99)
GLUCOSE, URINE: NEGATIVE MG/DL
HEMATOCRIT (%) IN BLOOD BY AUTOMATED COUNT: 41.2 % (ref 36–46)
HEMOGLOBIN (G/DL) IN BLOOD: 12.1 G/DL (ref 12–16)
HYALINE CASTS, URINE: ABNORMAL /LPF
KETONES, URINE: NEGATIVE MG/DL
LEUKOCYTE ESTERASE, URINE: ABNORMAL
LEUKOCYTES (10*3/UL) IN BLOOD BY AUTOMATED COUNT: 7.9 X10E9/L (ref 4.4–11.3)
NITRITE, URINE: NEGATIVE
NRBC (PER 100 WBCS) BY AUTOMATED COUNT: 0 /100 WBC (ref 0–0)
PARATHYRIN INTACT (PG/ML) IN SER/PLAS: 98.6 PG/ML (ref 18.5–88)
PH, URINE: 5 (ref 5–8)
PHOSPHATE (MG/DL) IN SER/PLAS: 4.4 MG/DL (ref 2.5–4.9)
PLATELETS (10*3/UL) IN BLOOD AUTOMATED COUNT: 260 X10E9/L (ref 150–450)
POTASSIUM (MMOL/L) IN SER/PLAS: 5.1 MMOL/L (ref 3.5–5.3)
PROTEIN (MG/DL) IN URINE: 26 MG/DL (ref 5–24)
PROTEIN, URINE: NEGATIVE MG/DL
PROTEIN/CREATININE (MG/MG) IN URINE: 0.76 MG/MG CREAT (ref 0–0.17)
RBC, URINE: 36 /HPF (ref 0–5)
SODIUM (MMOL/L) IN SER/PLAS: 139 MMOL/L (ref 136–145)
SPECIFIC GRAVITY, URINE: 1.01 (ref 1–1.03)
UREA NITROGEN (MG/DL) IN SER/PLAS: 32 MG/DL (ref 6–23)
UROBILINOGEN, URINE: <2 MG/DL (ref 0–1.9)
WBC CLUMPS, URINE: ABNORMAL /HPF
WBC, URINE: 1020 /HPF (ref 0–5)

## 2023-08-31 ENCOUNTER — TELEPHONE (OUTPATIENT)
Dept: PRIMARY CARE | Facility: CLINIC | Age: 83
End: 2023-08-31
Payer: MEDICARE

## 2023-08-31 NOTE — TELEPHONE ENCOUNTER
Patient daughter Sana is calling in regards to medications. On pt discharge summary it said to continue Norvasc but patient was taken off this medication by you about a year ago. They are unsure if this is to be restarted. The hospital did not mention this to them but was on discharge summary. Please advise.

## 2023-08-31 NOTE — TELEPHONE ENCOUNTER
The patient was recently discharged from [facility] for a mini stroke. The patient was told to follow up in 2 weeks time.  I spoke with her to schedule and the provider is scheduling past the two week norma. The patient refuses to see anyone other than her PCP.  She said she would just wait until October to see her then at the currently scheduled apt.  I explained the hospital wanted her seen in 2 weeks, she stated not if its not with Dr. Zafar

## 2023-08-31 NOTE — TELEPHONE ENCOUNTER
Please tell her that all of the doctors here work very closely together, I do recommend she see one of them for the hospital follow up.  If she refuses then I can see her in October or sooner if she wants (my next available)

## 2023-09-06 NOTE — PROGRESS NOTES
Subjective   Patient ID: Giselle Escobar is a 83 y.o. female who presents for Hospital Follow-up (Pt presents for hospital follow up- pt states that she is feeling better today. ).  HPI  Patient of Dr. Zafar  Admitted 8/29-8/31 due to acute stroke - presented with general weakness and slurred speech - woke up from a nap with inability to talk    Speech has improved, but is not back to normal entirely, supposed to go to speech therapy, but would like to defer until she is in [place]    Will be going to [place] for 6 months, leaving in about 3 weeks. Cleared for travel per neurologist. Son is concerned about her traveling, but she is insistent that she will be going, she will travel with her .     BP is excellent here in office today.   Reviewed notes from neurology and neurosurgery at [facility].   Recommend estblishing medical care in [place].     Persistent rate controlled atrial fibrillation - will be on xarelto + Aspirin (okay per neuro at [facility])  Patient discussed with neurology and will go on Crestor 10 mg for high lipids - no side effects at this time, has had tolerance issues with statins before.    Went home with plan for outpatient PT/OT, declined [facility]    Objective   Visit Vitals  /69 (BP Location: Right arm, Patient Position: Sitting, BP Cuff Size: Small adult)   Pulse 78   Temp 36.4 °C (97.6 °F) (Temporal)   Resp 12   Wt 75.3 kg (166 lb 1.6 oz)   SpO2 94%   BMI 29.42 kg/m²   OB Status Hysterectomy   Smoking Status Former   BSA 1.83 m²      Physical Exam  Speech is somewhat slow with occasional stutter, but per patient and son, near baseline.     Assessment/Plan   Problem List Items Addressed This Visit       Hypertension with renal disease     BP excellent today, continue same medication regimen.         Acute ischemic stroke (CMS/[facility])     Patient hospitalized 8/29-8/31, back near baseline. On Xarelto for afib and aspirin currently, followed up with neurosurgery and neurology at [facility]. Planning for  speech therapy. Continue good BP control.           Other Visit Diagnoses       Cerebrovascular accident (CVA), unspecified mechanism (CMS/HCC)    -  Primary

## 2023-09-11 ENCOUNTER — OFFICE VISIT (OUTPATIENT)
Dept: PRIMARY CARE | Facility: CLINIC | Age: 83
End: 2023-09-11
Payer: MEDICARE

## 2023-09-11 VITALS
HEART RATE: 78 BPM | SYSTOLIC BLOOD PRESSURE: 115 MMHG | OXYGEN SATURATION: 94 % | DIASTOLIC BLOOD PRESSURE: 69 MMHG | BODY MASS INDEX: 29.42 KG/M2 | TEMPERATURE: 97.6 F | RESPIRATION RATE: 12 BRPM | WEIGHT: 166.1 LBS

## 2023-09-11 DIAGNOSIS — I63.9 ACUTE ISCHEMIC STROKE (MULTI): ICD-10-CM

## 2023-09-11 DIAGNOSIS — I12.9 HYPERTENSION WITH RENAL DISEASE: ICD-10-CM

## 2023-09-11 DIAGNOSIS — I63.9 CEREBROVASCULAR ACCIDENT (CVA), UNSPECIFIED MECHANISM (MULTI): Primary | ICD-10-CM

## 2023-09-11 PROCEDURE — 99214 OFFICE O/P EST MOD 30 MIN: CPT | Performed by: FAMILY MEDICINE

## 2023-09-11 PROCEDURE — 1160F RVW MEDS BY RX/DR IN RCRD: CPT | Performed by: FAMILY MEDICINE

## 2023-09-11 PROCEDURE — 1159F MED LIST DOCD IN RCRD: CPT | Performed by: FAMILY MEDICINE

## 2023-09-11 PROCEDURE — 3074F SYST BP LT 130 MM HG: CPT | Performed by: FAMILY MEDICINE

## 2023-09-11 PROCEDURE — G0008 ADMIN INFLUENZA VIRUS VAC: HCPCS | Performed by: FAMILY MEDICINE

## 2023-09-11 PROCEDURE — 3078F DIAST BP <80 MM HG: CPT | Performed by: FAMILY MEDICINE

## 2023-09-11 PROCEDURE — 1036F TOBACCO NON-USER: CPT | Performed by: FAMILY MEDICINE

## 2023-09-11 PROCEDURE — 1125F AMNT PAIN NOTED PAIN PRSNT: CPT | Performed by: FAMILY MEDICINE

## 2023-09-11 PROCEDURE — 90662 IIV NO PRSV INCREASED AG IM: CPT | Performed by: FAMILY MEDICINE

## 2023-09-11 RX ORDER — ROSUVASTATIN CALCIUM 10 MG/1
1 TABLET, COATED ORAL NIGHTLY
COMMUNITY
Start: 2023-08-31 | End: 2023-09-30 | Stop reason: WASHOUT

## 2023-09-11 RX ORDER — ASPIRIN 81 MG/1
81 TABLET ORAL DAILY
COMMUNITY
End: 2023-11-07 | Stop reason: ALTCHOICE

## 2023-09-11 RX ORDER — METOPROLOL TARTRATE 25 MG/1
12.5 TABLET, FILM COATED ORAL
COMMUNITY
End: 2023-11-28 | Stop reason: ALTCHOICE

## 2023-09-11 NOTE — PATIENT INSTRUCTIONS
Establish with a neurologist in [place], along with a primary care physician - best if they are on Epic electronic medical records for access to records.     Set up OT (occupational therapy) in [place] to help with speech.    Keep appointment with Dr. Zafar as scheduled.

## 2023-09-12 PROBLEM — S49.90XA SHOULDER INJURY: Status: RESOLVED | Noted: 2023-04-25 | Resolved: 2023-09-12

## 2023-09-12 PROBLEM — I63.9 ACUTE ISCHEMIC STROKE (MULTI): Status: ACTIVE | Noted: 2023-08-31

## 2023-09-12 NOTE — ASSESSMENT & PLAN NOTE
Patient hospitalized 8/29-8/31, back near baseline. On Xarelto for afib and aspirin currently, followed up with neurosurgery and neurology at [facility]. Planning for speech therapy. Continue good BP control.

## 2023-09-19 LAB
BASOPHILS (10*3/UL) IN BLOOD BY AUTOMATED COUNT: 0.03 X10E9/L (ref 0–0.1)
BASOPHILS/100 LEUKOCYTES IN BLOOD BY AUTOMATED COUNT: 0.6 % (ref 0–2)
COBALAMIN (VITAMIN B12) (PG/ML) IN SER/PLAS: 1285 PG/ML (ref 211–911)
EOSINOPHILS (10*3/UL) IN BLOOD BY AUTOMATED COUNT: 0.17 X10E9/L (ref 0–0.4)
EOSINOPHILS/100 LEUKOCYTES IN BLOOD BY AUTOMATED COUNT: 3.4 % (ref 0–6)
ERYTHROCYTE DISTRIBUTION WIDTH (RATIO) BY AUTOMATED COUNT: 15.9 % (ref 11.5–14.5)
ERYTHROCYTE MEAN CORPUSCULAR HEMOGLOBIN CONCENTRATION (G/DL) BY AUTOMATED: 30.7 G/DL (ref 32–36)
ERYTHROCYTE MEAN CORPUSCULAR VOLUME (FL) BY AUTOMATED COUNT: 99 FL (ref 80–100)
ERYTHROCYTES (10*6/UL) IN BLOOD BY AUTOMATED COUNT: 3.41 X10E12/L (ref 4–5.2)
FERRITIN (UG/LL) IN SER/PLAS: 37 UG/L (ref 8–150)
HEMATOCRIT (%) IN BLOOD BY AUTOMATED COUNT: 33.9 % (ref 36–46)
HEMOGLOBIN (G/DL) IN BLOOD: 10.4 G/DL (ref 12–16)
IMMATURE GRANULOCYTES/100 LEUKOCYTES IN BLOOD BY AUTOMATED COUNT: 0.2 % (ref 0–0.9)
IRON (UG/DL) IN SER/PLAS: 50 UG/DL (ref 35–150)
IRON BINDING CAPACITY (UG/DL) IN SER/PLAS: 325 UG/DL (ref 240–445)
IRON SATURATION (%) IN SER/PLAS: 15 % (ref 25–45)
LEUKOCYTES (10*3/UL) IN BLOOD BY AUTOMATED COUNT: 5 X10E9/L (ref 4.4–11.3)
LYMPHOCYTES (10*3/UL) IN BLOOD BY AUTOMATED COUNT: 1.58 X10E9/L (ref 0.8–3)
LYMPHOCYTES/100 LEUKOCYTES IN BLOOD BY AUTOMATED COUNT: 31.5 % (ref 13–44)
MONOCYTES (10*3/UL) IN BLOOD BY AUTOMATED COUNT: 0.29 X10E9/L (ref 0.05–0.8)
MONOCYTES/100 LEUKOCYTES IN BLOOD BY AUTOMATED COUNT: 5.8 % (ref 2–10)
NEUTROPHILS (10*3/UL) IN BLOOD BY AUTOMATED COUNT: 2.94 X10E9/L (ref 1.6–5.5)
NEUTROPHILS/100 LEUKOCYTES IN BLOOD BY AUTOMATED COUNT: 58.5 % (ref 40–80)
PLATELETS (10*3/UL) IN BLOOD AUTOMATED COUNT: 245 X10E9/L (ref 150–450)

## 2023-09-22 LAB — COPPER: 100.9 UG/DL (ref 80–155)

## 2023-10-03 ENCOUNTER — APPOINTMENT (OUTPATIENT)
Dept: PRIMARY CARE | Facility: CLINIC | Age: 83
End: 2023-10-03
Payer: MEDICARE

## 2023-10-04 ENCOUNTER — APPOINTMENT (OUTPATIENT)
Dept: HEMATOLOGY/ONCOLOGY | Facility: CLINIC | Age: 83
End: 2023-10-04
Payer: MEDICARE

## 2023-10-17 ENCOUNTER — APPOINTMENT (OUTPATIENT)
Dept: HEMATOLOGY/ONCOLOGY | Facility: CLINIC | Age: 83
End: 2023-10-17
Payer: MEDICARE

## 2023-10-19 ENCOUNTER — TELEPHONE (OUTPATIENT)
Dept: PHARMACY | Facility: HOSPITAL | Age: 83
End: 2023-10-19
Payer: MEDICARE

## 2023-10-19 NOTE — TELEPHONE ENCOUNTER
Population Health: Outreach by Ambulatory Pharmacy Team    Payor: [employer]  Reason: Adherence  Medication: rosuvastatin 10mg (refill due 9/30)   Outcome: Left Voicemail Spoke with patient but now is not a good time. Gave patient phone number to call back at a time that is more convenient for her.     Kendra Cadena

## 2023-10-30 ENCOUNTER — TELEPHONE (OUTPATIENT)
Dept: PRIMARY CARE | Facility: CLINIC | Age: 83
End: 2023-10-30
Payer: MEDICARE

## 2023-10-30 NOTE — TELEPHONE ENCOUNTER
That is fine but insurance requires office visit for documentation   She either needs seen here or perhaps if has upcoming follow up with her specialists they could order it

## 2023-10-31 ENCOUNTER — LAB REQUISITION (OUTPATIENT)
Dept: LAB | Facility: HOSPITAL | Age: 83
End: 2023-10-31
Payer: MEDICARE

## 2023-10-31 DIAGNOSIS — R79.89 OTHER SPECIFIED ABNORMAL FINDINGS OF BLOOD CHEMISTRY: ICD-10-CM

## 2023-10-31 LAB
BASOPHILS # BLD AUTO: 0.04 X10*3/UL (ref 0–0.1)
BASOPHILS NFR BLD AUTO: 0.6 %
EOSINOPHIL # BLD AUTO: 0.11 X10*3/UL (ref 0–0.4)
EOSINOPHIL NFR BLD AUTO: 1.5 %
ERYTHROCYTE [DISTWIDTH] IN BLOOD BY AUTOMATED COUNT: 16.4 % (ref 11.5–14.5)
HCT VFR BLD AUTO: 28.5 % (ref 36–46)
HGB BLD-MCNC: 7.9 G/DL (ref 12–16)
IMM GRANULOCYTES # BLD AUTO: 0.03 X10*3/UL (ref 0–0.5)
IMM GRANULOCYTES NFR BLD AUTO: 0.4 % (ref 0–0.9)
LYMPHOCYTES # BLD AUTO: 1.86 X10*3/UL (ref 0.8–3)
LYMPHOCYTES NFR BLD AUTO: 25.9 %
MCH RBC QN AUTO: 30.5 PG (ref 26–34)
MCHC RBC AUTO-ENTMCNC: 27.7 G/DL (ref 32–36)
MCV RBC AUTO: 110 FL (ref 80–100)
MONOCYTES # BLD AUTO: 0.52 X10*3/UL (ref 0.05–0.8)
MONOCYTES NFR BLD AUTO: 7.2 %
NEUTROPHILS # BLD AUTO: 4.63 X10*3/UL (ref 1.6–5.5)
NEUTROPHILS NFR BLD AUTO: 64.4 %
NRBC BLD-RTO: 0 /100 WBCS (ref 0–0)
PLATELET # BLD AUTO: 303 X10*3/UL (ref 150–450)
PMV BLD AUTO: 9.6 FL (ref 7.5–11.5)
RBC # BLD AUTO: 2.59 X10*6/UL (ref 4–5.2)
WBC # BLD AUTO: 7.2 X10*3/UL (ref 4.4–11.3)

## 2023-10-31 PROCEDURE — 85025 COMPLETE CBC W/AUTO DIFF WBC: CPT

## 2023-10-31 NOTE — TELEPHONE ENCOUNTER
The patient's son Maxwell (on release) is calling in about hospital bed.  Maxwell was informed that the office had tried to call the patients spouse.  The patient is currently in a longterm center ([facility]  She fell recently and is in there for care.  The patient was evaluated today and will be reevaluated on Friday 11/3.    Maxwell is asking what all is needed to start the process for a hospital bed? He thinks his mom may need therapy services and care in the home once discharged. He does not know the discharge date as of yet.  I provided the fax number for the office and asked he have the [facility] send over the evaluation notes and anything else that would be beneficial to the provider to help determine the need for a bed and services.  We would assist as we could from there.    Maxwell can be reached at [phone]

## 2023-11-01 DIAGNOSIS — I63.9 ACUTE ISCHEMIC STROKE (MULTI): Primary | ICD-10-CM

## 2023-11-01 NOTE — TELEPHONE ENCOUNTER
See below- to order DME equipment needs face to face visit.  Can they get the order from the doctor at [facility]?  Otherwise she will need seen in our office upon discharge

## 2023-11-01 NOTE — TELEPHONE ENCOUNTER
I'm going to see if we can get it covered with her face to face visit with Dr. Beltran on 9/11/23  --- I printed an order, please fax to whichever DME company they use

## 2023-11-01 NOTE — TELEPHONE ENCOUNTER
I called miley back and he is not sure what company to use he is willing to use what ever company

## 2023-11-01 NOTE — TELEPHONE ENCOUNTER
I talked to Maxwell and he stated he did talk to the Dr At [facility] and they can order it but not until the day she gets released he is worried she will not have it in time and will have ot sleep on the floor   He is asking if you can do a virtual appt with them for this   He is not able to bring her in.

## 2023-11-03 NOTE — TELEPHONE ENCOUNTER
There was an opening 11/7/2023 pt is scheduled [facility]

## 2023-11-03 NOTE — TELEPHONE ENCOUNTER
I called miley and informed him insurance denied the hospital bed   Stated he is also working with ortho but they keep telling him to go thru pcp    Stated [facility] stated was extended until next wends to stay in the facility       Son is asking if this can be done virtually with you

## 2023-11-06 NOTE — PROGRESS NOTES
"Subjective   Patient ID: Gislele Escobar is a 83 y.o. female who presents for Follow-up (Discuss getting a hospital bed /She is doing the visit with her  and son ).    HPI     This visit was completed via telehealth with audio and video.  All issues as below were discussed and addressed.  The patient verbally consented to the visit.  Her  and son were also present during visit.      Patient was seen virtually- for face-to-face visit .  Discussed patient's need for a hospital bed.    She requires the head of the bed to be at least at 30 degree elevation.  She has a current and medical condition that requires she sleep in a hospital bed with adjustable head of the bed.  Bed rails would be helpful for her to be able to get in and out of the bed easier.      She was seen by Dr. Beltran on 9/11/23 for hospital follow up of an acute ischemic stroke -   She was then re-hospitalized following a fall and had a L femoral shaft fracture requiring surgery with orthopedics on 9/27/23- S/P ORIF left distal femur.    She is being discharged from [facility] to home soon, not sure on date yet.    She is wearing a TROM brace with ROM restriction set to 0-45 and sees ortho today, doing 50% PWB. Ortho- Dr. Jay.  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    Orthopedic notes also state \"A prescription for a hospital bed has been written since she is being discharged from rehab. A hospital bed is required to help alleviate pain, assist in transfers to and from a walker/wheelchair that is not feasible with an ordinary bed due to patient's current condition.\"   Her son states she had a bladder infection and has a catheter in and has to see urology outpatient as well.      Review of Systems   Constitutional:  Negative for chills, fatigue and fever.   Respiratory:  Negative for cough and shortness of breath.    Cardiovascular:  Negative for chest pain and palpitations.   Musculoskeletal:  Positive for " gait problem.       Objective   There were no vitals taken for this visit.    Physical Exam    Constitutional: Well developed, well nourished, alert and in no acute distress   Eyes: Normal external exam.   Neurologic: Cranial nerves II-XII grossly intact.   Psychiatric: Mood calm and affect normal.      Assessment/Plan   Problem List Items Addressed This Visit             ICD-10-CM    Acute ischemic stroke (CMS/[facility]) I63.9    Fall W19.XXXA    Tressa-prosthetic supracondylar fracture of femur M97.8XXA, Z96.659    Relevant Orders    Hospital Bed     Other Visit Diagnoses         Codes    S/P ORIF (open reduction internal fixation) fracture    -  Primary Z98.890, Z87.81    Relevant Orders    Hospital Bed    Closed fracture of left femur with delayed healing, unspecified fracture morphology, unspecified portion of femur, subsequent encounter     S72.92XG    Relevant Orders    Hospital Bed    Skin necrosis (CMS/[facility])     I96    Relevant Orders    Hospital Bed    Urinary tract infection without hematuria, site unspecified     N39.0    Urinary catheter in place     Z96.0          A total of 30-39 minutes were spent on this patient encounter     Prescription for hospital bed to be faxed to [facility] at family request    Makenna Zafar,   11/7/2023

## 2023-11-07 ENCOUNTER — TELEMEDICINE (OUTPATIENT)
Dept: PRIMARY CARE | Facility: CLINIC | Age: 83
End: 2023-11-07
Payer: MEDICARE
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
## 2023-11-07 DIAGNOSIS — S72.92XG CLOSED FRACTURE OF LEFT FEMUR WITH DELAYED HEALING, UNSPECIFIED FRACTURE MORPHOLOGY, UNSPECIFIED PORTION OF FEMUR, SUBSEQUENT ENCOUNTER: ICD-10-CM

## 2023-11-07 DIAGNOSIS — Z96.659 PERIPROSTHETIC SUPRACONDYLAR FRACTURE OF FEMUR, SUBSEQUENT ENCOUNTER: ICD-10-CM

## 2023-11-07 DIAGNOSIS — I63.9 ACUTE ISCHEMIC STROKE (MULTI): ICD-10-CM

## 2023-11-07 DIAGNOSIS — Z96.0 URINARY CATHETER IN PLACE: ICD-10-CM

## 2023-11-07 DIAGNOSIS — I96 SKIN NECROSIS (MULTI): ICD-10-CM

## 2023-11-07 DIAGNOSIS — Z98.890 S/P ORIF (OPEN REDUCTION INTERNAL FIXATION) FRACTURE: Primary | ICD-10-CM

## 2023-11-07 DIAGNOSIS — M97.8XXD PERIPROSTHETIC SUPRACONDYLAR FRACTURE OF FEMUR, SUBSEQUENT ENCOUNTER: ICD-10-CM

## 2023-11-07 DIAGNOSIS — N39.0 URINARY TRACT INFECTION WITHOUT HEMATURIA, SITE UNSPECIFIED: ICD-10-CM

## 2023-11-07 DIAGNOSIS — Z87.81 S/P ORIF (OPEN REDUCTION INTERNAL FIXATION) FRACTURE: Primary | ICD-10-CM

## 2023-11-07 PROBLEM — M25.539 WRIST PAIN: Status: RESOLVED | Noted: 2023-04-25 | Resolved: 2023-11-07

## 2023-11-07 PROBLEM — S80.812A ABRASION OF LEFT LOWER LEG WITH INFECTION: Status: RESOLVED | Noted: 2022-05-17 | Resolved: 2023-11-07

## 2023-11-07 PROBLEM — S59.909A ELBOW INJURY: Status: RESOLVED | Noted: 2023-04-25 | Resolved: 2023-11-07

## 2023-11-07 PROBLEM — M79.643 PAIN OF HAND AFTER TRAUMA: Status: RESOLVED | Noted: 2023-04-25 | Resolved: 2023-11-07
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
## 2023-11-07 PROBLEM — L08.9 ABRASION OF LEFT LOWER LEG WITH INFECTION: Status: RESOLVED | Noted: 2022-05-17 | Resolved: 2023-11-07

## 2023-11-07 PROBLEM — Z96.652 HISTORY OF TOTAL KNEE REPLACEMENT, LEFT: Status: RESOLVED | Noted: 2023-09-26 | Resolved: 2023-11-07

## 2023-11-07 PROBLEM — Z86.73 H/O TIA (TRANSIENT ISCHEMIC ATTACK) AND STROKE: Status: RESOLVED | Noted: 2023-11-07 | Resolved: 2023-11-07

## 2023-11-07 PROBLEM — M97.8XXA PERI-PROSTHETIC SUPRACONDYLAR FRACTURE OF FEMUR: Status: ACTIVE | Noted: 2023-09-25

## 2023-11-07 PROCEDURE — 99214 OFFICE O/P EST MOD 30 MIN: CPT | Performed by: FAMILY MEDICINE

## 2023-11-07 RX ORDER — AMLODIPINE BESYLATE 2.5 MG/1
2.5 TABLET ORAL DAILY
COMMUNITY
End: 2024-02-16 | Stop reason: ALTCHOICE

## 2023-11-07 RX ORDER — OXYCODONE AND ACETAMINOPHEN 5; 325 MG/1; MG/1
TABLET ORAL
COMMUNITY
Start: 2023-09-30 | End: 2024-01-05 | Stop reason: ALTCHOICE

## 2023-11-07 ASSESSMENT — ENCOUNTER SYMPTOMS
FATIGUE: 0
PALPITATIONS: 0
CHILLS: 0
FEVER: 0
SHORTNESS OF BREATH: 0
COUGH: 0

## 2023-11-07 NOTE — PROGRESS NOTES
"Subjective   Patient ID: Giselle Escobar is a 83 y.o. female who presents for Follow-up (Discuss getting a hospital bed /She is doing the visit with her  and son ).    HPI     This visit was completed via telehealth with audio and video.  All issues as below were discussed and addressed.  The patient verbally consented to the visit.  Her  and son were also present during visit.      Patient was seen virtually- for face-to-face visit .  Discussed patient's need for a hospital bed.    She requires the head of the bed to be at least at 30 degree elevation.  She has a current and medical condition that requires she sleep in a hospital bed with adjustable head of the bed.  Bed rails would be helpful for her to be able to get in and out of the bed easier.      She was seen by Dr. Beltran on 9/11/23 for hospital follow up of an acute ischemic stroke -   She was then re-hospitalized following a fall and had a L femoral shaft fracture requiring surgery with orthopedics on 9/27/23- S/P ORIF left distal femur.    She is being discharged from [facility] to home soon, not sure on date yet.    She is wearing a TROM brace with ROM restriction set to 0-45 and sees ortho today, doing 50% PWB. Ortho- Dr. Jay.  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    Orthopedic notes also state \"A prescription for a hospital bed has been written since she is being discharged from rehab. A hospital bed is required to help alleviate pain, assist in transfers to and from a walker/wheelchair that is not feasible with an ordinary bed due to patient's current condition.\"   Her son states she had a bladder infection and has a catheter in and has to see urology outpatient as well.      Review of Systems   Constitutional:  Negative for chills, fatigue and fever.   Respiratory:  Negative for cough and shortness of breath.    Cardiovascular:  Negative for chest pain and palpitations.   Musculoskeletal:  Positive for " gait problem.       Objective   There were no vitals taken for this visit.    Physical Exam    Constitutional: Well developed, well nourished, alert and in no acute distress   Eyes: Normal external exam.   Neurologic: Cranial nerves II-XII grossly intact.   Psychiatric: Mood calm and affect normal.      Assessment/Plan   Problem List Items Addressed This Visit             ICD-10-CM    Acute ischemic stroke (CMS/[facility]) I63.9    Fall W19.XXXA    Tressa-prosthetic supracondylar fracture of femur M97.8XXA, Z96.659    Relevant Orders    Hospital Bed     Other Visit Diagnoses         Codes    S/P ORIF (open reduction internal fixation) fracture    -  Primary Z98.890, Z87.81    Relevant Orders    Hospital Bed    Closed fracture of left femur with delayed healing, unspecified fracture morphology, unspecified portion of femur, subsequent encounter     S72.92XG    Relevant Orders    Hospital Bed    Skin necrosis (CMS/[facility])     I96    Relevant Orders    Hospital Bed    Urinary tract infection without hematuria, site unspecified     N39.0    Urinary catheter in place     Z96.0            A total of 30-39 minutes were spent on this patient encounter     Prescription for hospital bed to be faxed to [place] at family request    Makenna Zafar,   11/7/2023

## 2023-11-10 ENCOUNTER — TELEPHONE (OUTPATIENT)
Dept: HEMATOLOGY/ONCOLOGY | Facility: CLINIC | Age: 83
End: 2023-11-10
Payer: MEDICARE

## 2023-11-14 NOTE — TELEPHONE ENCOUNTER
Per provider patient to receive IV Iron.  Call p laced to patient.  Spoke with ,  Per  patient fell and broke her leg and is in rehab for the next few weeks.  Cell number provided by  [phone].  Call pl aced to number.  No answer and no voicemail.

## 2023-11-15 NOTE — TELEPHONE ENCOUNTER
Patient called back.  She is currently in rehab and should be discharged within the next week.  Patient notified of providers recommendation for IV Iron.  Teaching provided.  Patient states she will call back once Dced from rehab and able to get in.  Call back instructions reviewed.  Patient verbalized understanding.

## 2023-11-24 ENCOUNTER — TELEPHONE (OUTPATIENT)
Dept: PRIMARY CARE | Facility: CLINIC | Age: 83
End: 2023-11-24
Payer: MEDICARE

## 2023-11-24 DIAGNOSIS — R33.9 URINARY RETENTION: Primary | ICD-10-CM

## 2023-11-24 NOTE — TELEPHONE ENCOUNTER
Nelda calling from [facility] calling for verbal orders for skilled nursing/therapy after her being released from hospital    Medications were changed while in hospital/rehab     She does have stringer catheter for retention but no lines    She does not have referral for urology, would you manage this or refer her elsewhere?     Patient will call for hospital follow up    [phone]

## 2023-11-27 ENCOUNTER — PATIENT OUTREACH (OUTPATIENT)
Dept: CARE COORDINATION | Facility: CLINIC | Age: 83
End: 2023-11-27
Payer: MEDICARE

## 2023-11-27 ENCOUNTER — DOCUMENTATION (OUTPATIENT)
Dept: PRIMARY CARE | Facility: CLINIC | Age: 83
End: 2023-11-27
Payer: MEDICARE

## 2023-11-27 ENCOUNTER — TELEPHONE (OUTPATIENT)
Dept: PRIMARY CARE | Facility: CLINIC | Age: 83
End: 2023-11-27
Payer: MEDICARE
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
## 2023-11-27 PROBLEM — I67.1 BRAIN ANEURYSM (HHS-HCC): Status: ACTIVE | Noted: 2023-11-27

## 2023-11-27 RX ORDER — METOPROLOL SUCCINATE 25 MG/1
12.5 TABLET, EXTENDED RELEASE ORAL DAILY
COMMUNITY
Start: 2023-11-20

## 2023-11-27 RX ORDER — ATORVASTATIN CALCIUM 20 MG/1
20 TABLET, FILM COATED ORAL DAILY
COMMUNITY
Start: 2023-11-20 | End: 2023-11-28 | Stop reason: ALTCHOICE

## 2023-11-27 RX ORDER — MIRTAZAPINE 7.5 MG/1
7.5 TABLET, FILM COATED ORAL NIGHTLY
COMMUNITY
Start: 2023-11-20 | End: 2023-11-28 | Stop reason: ALTCHOICE

## 2023-11-27 RX ORDER — POTASSIUM CHLORIDE 1500 MG/1
20 TABLET, EXTENDED RELEASE ORAL NIGHTLY
COMMUNITY
Start: 2023-11-20 | End: 2023-11-28 | Stop reason: ALTCHOICE

## 2023-11-27 RX ORDER — FERROUS SULFATE 325(65) MG
65 TABLET, DELAYED RELEASE (ENTERIC COATED) ORAL 2 TIMES DAILY
COMMUNITY

## 2023-11-27 NOTE — PROGRESS NOTES
The patient is here for a hospital follow up.  Hospital records were reviewed prior to visit, including relevant labs, imaging findings, consultant notes, and discharge summary.  Medications were reconciled and are current, reviewed today.    Admission Date: 9/29/23  Discharge Date: 11/22/23  She was recently discharged home from [facility] after being admitted for skilled therapy following surgery for L distal femur fracture-  Needs ortho follow up outpatient- scheduled 11/30/23- Lico Polanco / Dr. Jay   She has stringer catheter for urinary retention- referred to urology- tx for UTI with cipro- scheduled 12/1/23- concern has another UTI, urine dark color, sediment in bag.  Never had issues with retention prior to hip surgery.  Would get up 3-4 times at night to void.  Bag changed 10/31/23.    Homecare agency- [facility]     FACE-TO-FACE CERTIFICATION OF HOMEBOUND STATUS:  She is homebound due to the following reasons:  has poor endurance, has weakness and pain limiting ambulation and requiring assistance to leave the home due to recent surgery, is homebound due to chronic illness, is homebound due to limited weight bearing because of an orthopedic condition. She requires physical assistance due to impaired balance, unable to leave home unsupervised due to cerebral vascular disease. She requires significant assistance of another person to leave home due to weakness.    HOME SKILLED NURSING:  She requires home skilled nursing assessment and treatment due to: recent hospitalization, recent new diagnosis or exacerbation of chronic diagnosis, change in medication in the last 30 days, urinary catheter care.    HOME PHYSICAL THERAPY:   She requires home physical therapy assessment and treatment due to: recent marked decline in functional status, poor endurance, poor strength, recent fall, fractures, stroke and need for a home maintenance program to maintain current level of function.    New  "meds-   Taking ferrous sulfate 325mg twice daily   potassium 20meq daily- not sure why   mirtazapine 7.5mg daily (son states patient hasnt been taking since her appetite is good)    HTN- meds were changed- losartan stopped, only take amlodipine if BP greater than 110/60      CKD stage 4- Dr. Marte's group     A fib, CAD- on xarelto, toprol XL 12.5 mg daily  Shabnam Carranza     Stroke in August- followed up with neurology [facility] who added crestor and aspirin- Cheryl Quiles CNP ; also saw Magalys ALVARADO for incidental bilateral cavernous ICA aneurysms - advised MRA brain wo contrast in 1 year to follow .  She has been taking atorvastatin since in rehab but has had difficulty with that one in past.      Cbc bmp Patient: Giselle Escobar  : 1940  PCP: Makenna Zafar DO  MRN: [id number]  Program: No linked episodes     Giselle Escobar is a 83 y.o. female presenting today for follow-up after being discharged from the hospital 6 days ago. The main problem requiring admission was femur fracture. The discharge summary and/or Transitional Care Management documentation was reviewed. Medication reconciliation was performed as indicated via the \"[place] as Reviewed\" timestamp.     Giselle Escobar was contacted by Transitional Care Management services two days after her discharge. This encounter and supporting documentation was reviewed.    The complexity of medical decision making for this patient's transitional care is high.    Physical Exam    Constitutional: Well developed, well nourished, alert and in no acute distress, patient in wheelchair.    Eyes: Normal external exam.   Cardiovascular: Regular rate and rhythm, normal S1 and S2, no murmurs, gallops, or rubs. Trace peripheral edema.  Pulmonary: No respiratory distress, lungs clear to auscultation bilaterally. No wheezes, rhonchi, rales.  Skin: Warm, well perfused, normal skin turgor and color. Scab noted on L anterior knee, brace on leg.    Hummel catheter bag with " cloudy dark urine with sediment.    Neurologic: Cranial nerves II-XII grossly intact.   Psychiatric: Mood calm and affect normal.        Assessment/Plan   Problem List Items Addressed This Visit             ICD-10-CM    CAD (coronary artery disease) I25.10    Hyperlipidemia E78.5     Switch atorvastatin to rosuvastatin          Hypertension with renal disease I12.9    Relevant Orders    Basic Metabolic Panel    Paroxysmal atrial fibrillation (CMS/HCC) I48.0    Stage 4 chronic kidney disease (CMS/HCC) N18.4    Relevant Orders    CBC and Auto Differential    Acute ischemic stroke (CMS/[facility]) I63.9    Relevant Medications    rosuvastatin (Crestor) 20 mg tablet    Tressa-prosthetic supracondylar fracture of femur M97.8XXA, Z96.659     Follow up with orthopedics is scheduled 11/30/23          Urinary retention R33.9     Unclear etiology, will reculture today due to cloudy urine, due for stringer bag change soon  Follow up with urology scheduled 12/1/23 for management          Relevant Orders    Urinalysis with Reflex Microscopic    Urine Culture     Other Visit Diagnoses         Codes    Hospital discharge follow-up    -  Primary Z09    Urinary tract infection without hematuria, site unspecified     N39.0    Stringer catheter in place     Z97.8    Relevant Orders    Urinalysis with Reflex Microscopic    Urine Culture          Stop potassium    Check labs in 1-2 weeks       Review of Systems   Constitutional:  Negative for fever.   Respiratory:  Negative for cough and shortness of breath.    Cardiovascular:  Negative for chest pain and leg swelling.   Gastrointestinal:  Negative for abdominal pain, nausea and vomiting.   Genitourinary:  Positive for difficulty urinating. Negative for dysuria, frequency, hematuria and urgency.       Family History   Problem Relation Name Age of Onset    Diabetes Mother Dd     Heart attack Mother Dd     Heart attack Father      ALS Son         Engagement  Call Start Time: 1445 (11/27/2023  2:58  PM)    Medications  Medications reviewed with patient/caregiver?: Yes (11/27/2023  2:58 PM)  Is the patient having any side effects they believe may be caused by any medication additions or changes?: No (11/27/2023  2:58 PM)  Does the patient have all medications ordered at discharge?: Yes (11/27/2023  2:58 PM)  Care Management Interventions: No intervention needed (11/27/2023  2:58 PM)  Prescription Comments: Reviewed medicine list with son on the phone, new medications include atorvastatin 20mg, ferrous sulfate 325mg twice daily, potassium 20meq daily, mirtazapine 7.5mg dailt (son states patient hasnt been taking since her appetite is good), stop losartan, only take amlodipine if BP greater than 110/60 (11/27/2023  2:58 PM)  Is the patient taking all medications as directed (includes completed medication regime)?: Yes (11/27/2023  2:58 PM)  Care Management Interventions: Provided patient education (11/27/2023  2:58 PM)  Medication Comments: No issues obtaining medications. Has questions on BP controlling medication. Son reviewed several BP readings over last day or so and one time BP was 94/56 before bed. Other values range from 117//104. No dizziness or lightheadedness noted. (11/27/2023  2:58 PM)    Appointments  Does the patient have a primary care provider?: Yes (11/27/2023  2:58 PM)  Care Management Interventions: Verified appointment date/time/provider (11/27/2023  2:58 PM)  Has the patient kept scheduled appointments due by today?: Yes (11/27/2023  2:58 PM)  Care Management Interventions: Educated on importance of keeping appointment (11/27/2023  2:58 PM)    Self Management  What is the home health agency?: [facility] (11/27/2023  2:58 PM)  Has home health visited the patient within 72 hours of discharge?: Yes (11/27/2023  2:58 PM)  What Durable Medical Equipment (DME) was ordered?: hospital bed (11/27/2023  2:58 PM)  Has all Durable Medical Equipment (DME) been delivered?: Yes  (11/27/2023  2:58 PM)    Patient Teaching  Does the patient have access to their discharge instructions?: Yes (11/27/2023  2:58 PM)  Care Management Interventions: Reviewed instructions with patient (11/27/2023  2:58 PM)  What is the patient's perception of their health status since discharge?: Improving (11/27/2023  2:58 PM)  Is the patient/caregiver able to teach back the hierarchy of who to call/visit for symptoms/problems? PCP, Specialist, Home Health nurse, Urgent Care, ED, 911: Yes (11/27/2023  2:58 PM)  Patient/Caregiver Education Comments: Aware to use precautions when it comes to mobilty for hip. (11/27/2023  2:58 PM)    Wrap Up  Wrap Up Additional Comments: Doing better at home per son, reports patient has fatigue and concerned if has another UTI. Was treated with cipro in SNF. He states stringer catheter has been in since September, but on the catheter bag it is dated 10/31/23. Due for change soon if this is correct date if consistent with monthly changes. Son states they have a urology appt Friday morning 12/1. States patient's urine is dark yellow with some sediment. Has ortho appt 11/30. (11/27/2023  2:58 PM)  Call End Time: 1455 (11/27/2023  2:58 PM)        No follow-ups on file.    Makenna Zafar, DO  11/28/2023

## 2023-11-27 NOTE — TELEPHONE ENCOUNTER
Patients son called to schedule in person hospital follow up    You do not have any openings in the next few weeks for follow up    Son is concerned her BP is running too low on amlodipine (in 90's)    Please advise if we can use acute slot

## 2023-11-27 NOTE — PROGRESS NOTES
Subjective   Patient ID: Giselle Escobar is a 83 y.o. female who presents for No chief complaint on file..    HPI     The patient is here for a hospital follow up.  Hospital records were reviewed prior to visit, including relevant labs, imaging findings, consultant notes, and discharge summary.  Medications were reconciled and are current, reviewed today.    Admission Date: 9/29/23  Discharge Date: 11/22/23  She was recently discharged home from [facility] after being admitted for skilled therapy following surgery for L distal femur fracture-  Needs ortho follow up outpatient- scheduled 11/30/23- [facility] Polanco / Dr. Jay   She has stringer catheter for urinary retention- referred to urology- tx for UTI with cipro- scheduled 12/1/23- concern has another UTI, urine dark color, patient fatigued   Homecare agency- [facility]     FACE-TO-FACE CERTIFICATION OF HOMEBOUND STATUS:  She is homebound due to the following reasons:  has poor endurance, has weakness and pain limiting ambulation and requiring assistance to leave the home due to recent surgery, is homebound due to chronic illness, is homebound due to limited weight bearing because of an orthopedic condition. She requires physical assistance due to impaired balance, unable to leave home unsupervised due to cerebral vascular disease. She requires significant assistance of another person to leave home due to weakness.    HOME SKILLED NURSING:  She requires home skilled nursing assessment and treatment due to: recent hospitalization, recent new diagnosis or exacerbation of chronic diagnosis, change in medication in the last 30 days, urinary catheter care.    HOME PHYSICAL THERAPY:   She requires home physical therapy assessment and treatment due to: recent marked decline in functional status, poor endurance, poor strength, recent fall, fractures, stroke and need for a home maintenance program to maintain current level of function.    Need to  change lipitor to crestor  ferrous sulfate 325mg twice daily   potassium 20meq daily  mirtazapine 7.5mg daily (son states patient hasnt been taking since her appetite is good)    HTN- meds were changed- losartan stopped, only take amlodipine if BP greater than 110/60      CKD stage 4- Dr. Marte's group     A fib, CAD- on xarelto, toprol XL 12.5 mg daily?   Shabnam Carranza     Stroke in August- followed up with neurology [facility] who added crestor and aspirin- Cheryl Quiles CNP ; also saw Magalys ALVARADO for incidental bilateral cavernous ICA aneurysms - advised MRA brain wo contrast in 1 year to follow     Cbc bmp     Review of Systems    Objective   There were no vitals taken for this visit.    Physical Exam    Assessment/Plan   {Assess/PlanSmartLinks:99890}

## 2023-11-27 NOTE — PROGRESS NOTES
Discharge Facility: [facility]  Discharge Diagnosis: rehab following hospital stay for fall with femur fracture and repair  Admission Date: 9/29/23  Discharge Date: 11/22/23    PCP Appointment Date: 11/28/23  Specialist Appointment Date:  11/30/23 ortho  12/1/23 urology  Hospital Encounter and Summary: Linked  See discharge assessment below for further details    Engagement  Call Start Time: 1445 (11/27/2023  2:58 PM)    Medications  Medications reviewed with patient/caregiver?: Yes (11/27/2023  2:58 PM)  Is the patient having any side effects they believe may be caused by any medication additions or changes?: No (11/27/2023  2:58 PM)  Does the patient have all medications ordered at discharge?: Yes (11/27/2023  2:58 PM)  Care Management Interventions: No intervention needed (11/27/2023  2:58 PM)  Prescription Comments: Reviewed medicine list with son on the phone, new medications include atorvastatin 20mg, ferrous sulfate 325mg twice daily, potassium 20meq daily, mirtazapine 7.5mg dailt (son states patient hasnt been taking since her appetite is good), stop losartan, only take amlodipine if BP greater than 110/60 (11/27/2023  2:58 PM)  Is the patient taking all medications as directed (includes completed medication regime)?: Yes (11/27/2023  2:58 PM)  Care Management Interventions: Provided patient education (11/27/2023  2:58 PM)  Medication Comments: No issues obtaining medications. Has questions on BP controlling medication. Son reviewed several BP readings over last day or so and one time BP was 94/56 before bed. Other values range from 117//104. No dizziness or lightheadedness noted. (11/27/2023  2:58 PM)    Appointments  Does the patient have a primary care provider?: Yes (11/27/2023  2:58 PM)  Care Management Interventions: Verified appointment date/time/provider (11/27/2023  2:58 PM)  Has the patient kept scheduled appointments due by today?: Yes (11/27/2023  2:58 PM)  Care  Management Interventions: Educated on importance of keeping appointment (11/27/2023  2:58 PM)    Self Management  What is the home health agency?: [facility] (11/27/2023  2:58 PM)  Has home health visited the patient within 72 hours of discharge?: Yes (11/27/2023  2:58 PM)  What Durable Medical Equipment (DME) was ordered?: hospital bed (11/27/2023  2:58 PM)  Has all Durable Medical Equipment (DME) been delivered?: Yes (11/27/2023  2:58 PM)    Patient Teaching  Does the patient have access to their discharge instructions?: Yes (11/27/2023  2:58 PM)  Care Management Interventions: Reviewed instructions with patient (11/27/2023  2:58 PM)  What is the patient's perception of their health status since discharge?: Improving (11/27/2023  2:58 PM)  Is the patient/caregiver able to teach back the hierarchy of who to call/visit for symptoms/problems? PCP, Specialist, Home Health nurse, Urgent Care, ED, 911: Yes (11/27/2023  2:58 PM)  Patient/Caregiver Education Comments: Aware to use precautions when it comes to mobilty for hip. (11/27/2023  2:58 PM)    Wrap Up  Wrap Up Additional Comments: Doing better at home per son, reports patient has fatigue and concerned if has another UTI. Was treated with cipro in SNF. He states stringer catheter has been in since September, but on the catheter bag it is dated 10/31/23. Due for change soon if this is correct date if consistent with monthly changes. Son states they have a urology appt Friday morning 12/1. States patient's urine is dark yellow with some sediment. Has ortho appt 11/30. (11/27/2023  2:58 PM)  Call End Time: 1455 (11/27/2023  2:58 PM)

## 2023-11-28 ENCOUNTER — OFFICE VISIT (OUTPATIENT)
Dept: PRIMARY CARE | Facility: CLINIC | Age: 83
End: 2023-11-28
Payer: MEDICARE

## 2023-11-28 VITALS
RESPIRATION RATE: 16 BRPM | TEMPERATURE: 97.8 F | WEIGHT: 161.3 LBS | BODY MASS INDEX: 28.57 KG/M2 | HEART RATE: 94 BPM | SYSTOLIC BLOOD PRESSURE: 147 MMHG | DIASTOLIC BLOOD PRESSURE: 76 MMHG | OXYGEN SATURATION: 94 %

## 2023-11-28 DIAGNOSIS — I12.9 HYPERTENSION WITH RENAL DISEASE: ICD-10-CM

## 2023-11-28 DIAGNOSIS — I48.0 PAROXYSMAL ATRIAL FIBRILLATION (MULTI): ICD-10-CM

## 2023-11-28 DIAGNOSIS — E78.5 HYPERLIPIDEMIA, UNSPECIFIED HYPERLIPIDEMIA TYPE: ICD-10-CM

## 2023-11-28 DIAGNOSIS — Z97.8 FOLEY CATHETER IN PLACE: ICD-10-CM

## 2023-11-28 DIAGNOSIS — I63.9 ACUTE ISCHEMIC STROKE (MULTI): ICD-10-CM

## 2023-11-28 DIAGNOSIS — I25.10 CORONARY ARTERY DISEASE INVOLVING NATIVE CORONARY ARTERY OF NATIVE HEART WITHOUT ANGINA PECTORIS: ICD-10-CM

## 2023-11-28 DIAGNOSIS — M97.8XXD PERIPROSTHETIC SUPRACONDYLAR FRACTURE OF FEMUR, SUBSEQUENT ENCOUNTER: ICD-10-CM

## 2023-11-28 DIAGNOSIS — R33.9 URINARY RETENTION: ICD-10-CM

## 2023-11-28 DIAGNOSIS — N18.4 STAGE 4 CHRONIC KIDNEY DISEASE (MULTI): ICD-10-CM

## 2023-11-28 DIAGNOSIS — Z96.659 PERIPROSTHETIC SUPRACONDYLAR FRACTURE OF FEMUR, SUBSEQUENT ENCOUNTER: ICD-10-CM

## 2023-11-28 DIAGNOSIS — N39.0 URINARY TRACT INFECTION WITHOUT HEMATURIA, SITE UNSPECIFIED: ICD-10-CM

## 2023-11-28 DIAGNOSIS — Z09 HOSPITAL DISCHARGE FOLLOW-UP: Primary | ICD-10-CM

## 2023-11-28 PROCEDURE — 81001 URINALYSIS AUTO W/SCOPE: CPT

## 2023-11-28 PROCEDURE — 1125F AMNT PAIN NOTED PAIN PRSNT: CPT | Performed by: FAMILY MEDICINE

## 2023-11-28 PROCEDURE — 1160F RVW MEDS BY RX/DR IN RCRD: CPT | Performed by: FAMILY MEDICINE

## 2023-11-28 PROCEDURE — 3078F DIAST BP <80 MM HG: CPT | Performed by: FAMILY MEDICINE

## 2023-11-28 PROCEDURE — 3077F SYST BP >= 140 MM HG: CPT | Performed by: FAMILY MEDICINE

## 2023-11-28 PROCEDURE — 87186 SC STD MICRODIL/AGAR DIL: CPT

## 2023-11-28 PROCEDURE — 1036F TOBACCO NON-USER: CPT | Performed by: FAMILY MEDICINE

## 2023-11-28 PROCEDURE — 99496 TRANSJ CARE MGMT HIGH F2F 7D: CPT | Performed by: FAMILY MEDICINE

## 2023-11-28 PROCEDURE — 87086 URINE CULTURE/COLONY COUNT: CPT

## 2023-11-28 PROCEDURE — 1159F MED LIST DOCD IN RCRD: CPT | Performed by: FAMILY MEDICINE

## 2023-11-28 RX ORDER — ROSUVASTATIN CALCIUM 20 MG/1
20 TABLET, COATED ORAL DAILY
Qty: 90 TABLET | Refills: 3 | Status: SHIPPED | OUTPATIENT
Start: 2023-11-28 | End: 2023-12-05 | Stop reason: SDUPTHER

## 2023-11-28 ASSESSMENT — ENCOUNTER SYMPTOMS
VOMITING: 0
ABDOMINAL PAIN: 0
FREQUENCY: 0
DYSURIA: 0
DIFFICULTY URINATING: 1
COUGH: 0
SHORTNESS OF BREATH: 0
NAUSEA: 0
FEVER: 0
HEMATURIA: 0

## 2023-11-28 NOTE — ASSESSMENT & PLAN NOTE
Unclear etiology, will reculture today due to cloudy urine, due for stringer bag change soon  Follow up with urology scheduled 12/1/23 for management

## 2023-11-28 NOTE — PATIENT INSTRUCTIONS
Stop atorvastatin / lipitor-  will switch to rosuvastatin / crestor     Stop potassium    Check labs in 1-2 weeks

## 2023-11-29 LAB
APPEARANCE UR: ABNORMAL
BILIRUB UR STRIP.AUTO-MCNC: NEGATIVE MG/DL
COLOR UR: ABNORMAL
GLUCOSE UR STRIP.AUTO-MCNC: NEGATIVE MG/DL
KETONES UR STRIP.AUTO-MCNC: NEGATIVE MG/DL
LEUKOCYTE ESTERASE UR QL STRIP.AUTO: ABNORMAL
MUCOUS THREADS #/AREA URNS AUTO: ABNORMAL /LPF
NITRITE UR QL STRIP.AUTO: NEGATIVE
PH UR STRIP.AUTO: 8 [PH]
PROT UR STRIP.AUTO-MCNC: ABNORMAL MG/DL
RBC # UR STRIP.AUTO: NEGATIVE /UL
RBC #/AREA URNS AUTO: ABNORMAL /HPF
SP GR UR STRIP.AUTO: 1.01
SQUAMOUS #/AREA URNS AUTO: ABNORMAL /HPF
UROBILINOGEN UR STRIP.AUTO-MCNC: <2 MG/DL
WBC #/AREA URNS AUTO: ABNORMAL /HPF

## 2023-11-30 DIAGNOSIS — N39.0 URINARY TRACT INFECTION ASSOCIATED WITH INDWELLING URETHRAL CATHETER, SUBSEQUENT ENCOUNTER: Primary | ICD-10-CM

## 2023-11-30 DIAGNOSIS — T83.511D URINARY TRACT INFECTION ASSOCIATED WITH INDWELLING URETHRAL CATHETER, SUBSEQUENT ENCOUNTER: Primary | ICD-10-CM

## 2023-11-30 RX ORDER — CIPROFLOXACIN 250 MG/1
250 TABLET, FILM COATED ORAL 2 TIMES DAILY
Qty: 14 TABLET | Refills: 0 | Status: SHIPPED | OUTPATIENT
Start: 2023-11-30 | End: 2023-12-01 | Stop reason: ALTCHOICE

## 2023-12-01 DIAGNOSIS — N39.0 URINARY TRACT INFECTION ASSOCIATED WITH INDWELLING URETHRAL CATHETER, SUBSEQUENT ENCOUNTER: Primary | ICD-10-CM

## 2023-12-01 DIAGNOSIS — T83.511D URINARY TRACT INFECTION ASSOCIATED WITH INDWELLING URETHRAL CATHETER, SUBSEQUENT ENCOUNTER: Primary | ICD-10-CM

## 2023-12-01 LAB — BACTERIA UR CULT: ABNORMAL

## 2023-12-01 RX ORDER — SULFAMETHOXAZOLE AND TRIMETHOPRIM 400; 80 MG/1; MG/1
1 TABLET ORAL 2 TIMES DAILY
Qty: 28 TABLET | Refills: 0 | Status: SHIPPED | OUTPATIENT
Start: 2023-12-01 | End: 2023-12-15

## 2023-12-05 ENCOUNTER — PATIENT OUTREACH (OUTPATIENT)
Dept: CARE COORDINATION | Facility: CLINIC | Age: 83
End: 2023-12-05
Payer: MEDICARE

## 2023-12-05 DIAGNOSIS — I63.9 ACUTE ISCHEMIC STROKE (MULTI): ICD-10-CM

## 2023-12-05 RX ORDER — ROSUVASTATIN CALCIUM 10 MG/1
10 TABLET, COATED ORAL DAILY
Qty: 30 TABLET | Refills: 0 | Status: SHIPPED | OUTPATIENT
Start: 2023-12-05 | End: 2024-01-05 | Stop reason: ALTCHOICE

## 2023-12-05 RX ORDER — ROSUVASTATIN CALCIUM 10 MG/1
10 TABLET, COATED ORAL DAILY
Qty: 90 TABLET | Refills: 3 | Status: SHIPPED | OUTPATIENT
Start: 2023-12-05 | End: 2024-04-15

## 2023-12-05 NOTE — TELEPHONE ENCOUNTER
Please notify patient and home health care that her dose of rosuvastatin needs changed to 10 mg     I sent new rx- if she has the 20 mg at home, they need to break those in half for now due to kidney function

## 2023-12-05 NOTE — PROGRESS NOTES
Call regarding appt. with PCP on 12/5/23 after hospitalization.  At time of outreach call the patient feels as if their condition has improved since last visit.  Reviewed the PCP appointment with the pt and addressed any questions or concerns. Asking if PCP can send in short term prescription for crestor 10mg to her edie on file as the mail order may take up to a week to be sent in. She only has two days left of her atorvastatin 20mg from the nursing facility, does not have a form of crestor at home.

## 2023-12-12 ENCOUNTER — TELEPHONE (OUTPATIENT)
Dept: PRIMARY CARE | Facility: CLINIC | Age: 83
End: 2023-12-12
Payer: MEDICARE

## 2023-12-12 DIAGNOSIS — K22.70 BARRETT'S ESOPHAGUS WITHOUT DYSPLASIA: ICD-10-CM

## 2023-12-12 NOTE — TELEPHONE ENCOUNTER
Sabrina is aware and will reach out to them but she stills want to know what should be done about the patients pitting edema and her noncompliance. Sabrina says the patient refuses to wear compression socks, take her lasix or elevate her legs.

## 2023-12-12 NOTE — TELEPHONE ENCOUNTER
[facility] calling to report pt's bp has been running in the am 80 to 95 over 45 to 65 and that she has not been taking her bp meds. She also has pitting edema in both legs the left is a little worse. Pt stated she is to take her water pill PRN. Sabrina said she never heard of this and wanted to report to you.

## 2023-12-12 NOTE — TELEPHONE ENCOUNTER
Lasix previously discontinued, she should not be taking this especially with low BPs    She can elevate legs as able and although compression socks are recommended it is up to her if she uses them or not

## 2023-12-12 NOTE — TELEPHONE ENCOUNTER
I called and spoke with Sabrina from [facility], and verified that pt is taking the metoprolol XL 12.5mg daily, and that she only takes the amlodipine if her bp is greater than 110/60. Sabrina states that pt usually takes her bp around 9am and 6 pm. Sabrina states that her bp in the mornings have been really low, but by the time that she gets to see her around 4pm her bp is really good.

## 2023-12-12 NOTE — TELEPHONE ENCOUNTER
Next step is having them reach out to her cardiologist with these readings to see if they want to adjust her metoprolol

## 2023-12-12 NOTE — TELEPHONE ENCOUNTER
Please confirm she is taking metoprolol XL 12.5 mg daily     She is only take amlodipine if BP greater than 110/60

## 2023-12-13 RX ORDER — OMEPRAZOLE 20 MG/1
20 CAPSULE, DELAYED RELEASE ORAL DAILY
Qty: 90 CAPSULE | Refills: 3 | Status: SHIPPED | OUTPATIENT
Start: 2023-12-13

## 2024-01-04 ENCOUNTER — TELEPHONE (OUTPATIENT)
Dept: PRIMARY CARE | Facility: CLINIC | Age: 84
End: 2024-01-04
Payer: MEDICARE

## 2024-01-04 NOTE — TELEPHONE ENCOUNTER
MERLENE LANDMARK through insurance company    Merlene came over to patient's house  on New Years Day family was concerned that she had the flu then she had Uti symptoms. She had tested positive for UTI and she gave her antibiotics and that should have cleared it up to what the culture said it was. She also  treated patient for flu with brianne flu.Patient is still having symptoms of the Uti and not drinking a lot she already has bad kidneys and Merlene is concerned. She called the patient yesterday to see how she was doing.  She said the patient didn't sound good at all, she had productive moist cough, sent her for a chest x-ray and positive for pneumonia .  Merlene said that he office should have sent the x-ray and Urine culture over today.  She is wonder if you would see the patient to do blood work to see how her kidneys are working. She thinks that the patient could go down hill really quickly.             [phone]

## 2024-01-05 ENCOUNTER — TELEPHONE (OUTPATIENT)
Dept: PRIMARY CARE | Facility: CLINIC | Age: 84
End: 2024-01-05

## 2024-01-05 ENCOUNTER — LAB (OUTPATIENT)
Dept: LAB | Facility: LAB | Age: 84
End: 2024-01-05
Payer: MEDICARE

## 2024-01-05 ENCOUNTER — OFFICE VISIT (OUTPATIENT)
Dept: PRIMARY CARE | Facility: CLINIC | Age: 84
End: 2024-01-05
Payer: MEDICARE

## 2024-01-05 VITALS
TEMPERATURE: 97.8 F | OXYGEN SATURATION: 97 % | HEART RATE: 93 BPM | WEIGHT: 161.5 LBS | SYSTOLIC BLOOD PRESSURE: 153 MMHG | BODY MASS INDEX: 28.61 KG/M2 | RESPIRATION RATE: 16 BRPM | DIASTOLIC BLOOD PRESSURE: 79 MMHG

## 2024-01-05 DIAGNOSIS — J18.9 HCAP (HEALTHCARE-ASSOCIATED PNEUMONIA): ICD-10-CM

## 2024-01-05 DIAGNOSIS — I12.9 HYPERTENSION WITH RENAL DISEASE: ICD-10-CM

## 2024-01-05 DIAGNOSIS — H61.23 BILATERAL IMPACTED CERUMEN: ICD-10-CM

## 2024-01-05 DIAGNOSIS — J10.1 INFLUENZA A: ICD-10-CM

## 2024-01-05 DIAGNOSIS — N18.4 STAGE 4 CHRONIC KIDNEY DISEASE (MULTI): ICD-10-CM

## 2024-01-05 DIAGNOSIS — J18.9 PNEUMONIA OF LEFT LOWER LOBE DUE TO INFECTIOUS ORGANISM: Primary | ICD-10-CM

## 2024-01-05 DIAGNOSIS — N39.0 RECURRENT UTI: ICD-10-CM

## 2024-01-05 DIAGNOSIS — H91.93 HEARING DIFFICULTY OF BOTH EARS: ICD-10-CM

## 2024-01-05 LAB
POC RAPID INFLUENZA A: POSITIVE
POC RAPID INFLUENZA B: NEGATIVE

## 2024-01-05 PROCEDURE — 1125F AMNT PAIN NOTED PAIN PRSNT: CPT | Performed by: FAMILY MEDICINE

## 2024-01-05 PROCEDURE — 87804 INFLUENZA ASSAY W/OPTIC: CPT | Performed by: FAMILY MEDICINE

## 2024-01-05 PROCEDURE — 36415 COLL VENOUS BLD VENIPUNCTURE: CPT

## 2024-01-05 PROCEDURE — 69209 REMOVE IMPACTED EAR WAX UNI: CPT | Performed by: FAMILY MEDICINE

## 2024-01-05 PROCEDURE — 1159F MED LIST DOCD IN RCRD: CPT | Performed by: FAMILY MEDICINE

## 2024-01-05 PROCEDURE — 87634 RSV DNA/RNA AMP PROBE: CPT

## 2024-01-05 PROCEDURE — 3077F SYST BP >= 140 MM HG: CPT | Performed by: FAMILY MEDICINE

## 2024-01-05 PROCEDURE — 3078F DIAST BP <80 MM HG: CPT | Performed by: FAMILY MEDICINE

## 2024-01-05 PROCEDURE — 80053 COMPREHEN METABOLIC PANEL: CPT

## 2024-01-05 PROCEDURE — 85025 COMPLETE CBC W/AUTO DIFF WBC: CPT

## 2024-01-05 PROCEDURE — 87635 SARS-COV-2 COVID-19 AMP PRB: CPT

## 2024-01-05 PROCEDURE — 1160F RVW MEDS BY RX/DR IN RCRD: CPT | Performed by: FAMILY MEDICINE

## 2024-01-05 PROCEDURE — 1036F TOBACCO NON-USER: CPT | Performed by: FAMILY MEDICINE

## 2024-01-05 PROCEDURE — 99214 OFFICE O/P EST MOD 30 MIN: CPT | Performed by: FAMILY MEDICINE

## 2024-01-05 RX ORDER — ONDANSETRON 4 MG/1
TABLET, ORALLY DISINTEGRATING ORAL
COMMUNITY
Start: 2024-01-03 | End: 2024-02-02

## 2024-01-05 RX ORDER — GRANULES FOR ORAL 3 G/1
POWDER ORAL
COMMUNITY
Start: 2024-01-01 | End: 2024-02-02

## 2024-01-05 RX ORDER — IPRATROPIUM BROMIDE AND ALBUTEROL SULFATE 2.5; .5 MG/3ML; MG/3ML
SOLUTION RESPIRATORY (INHALATION)
COMMUNITY
Start: 2023-11-20

## 2024-01-05 RX ORDER — ATORVASTATIN CALCIUM 20 MG/1
TABLET, FILM COATED ORAL
COMMUNITY
Start: 2023-12-21 | End: 2024-01-05 | Stop reason: ALTCHOICE

## 2024-01-05 RX ORDER — MIRTAZAPINE 7.5 MG/1
TABLET, FILM COATED ORAL
COMMUNITY
Start: 2023-12-21

## 2024-01-05 RX ORDER — DOXYCYCLINE 100 MG/1
CAPSULE ORAL
COMMUNITY
Start: 2024-01-04 | End: 2024-02-02

## 2024-01-05 RX ORDER — AMOXICILLIN AND CLAVULANATE POTASSIUM 500; 125 MG/1; MG/1
1 TABLET, FILM COATED ORAL 2 TIMES DAILY
COMMUNITY
Start: 2024-01-04 | End: 2024-02-02

## 2024-01-05 ASSESSMENT — ENCOUNTER SYMPTOMS
FATIGUE: 1
PALPITATIONS: 0
VOMITING: 0
DIARRHEA: 0
NAUSEA: 1
SHORTNESS OF BREATH: 1
MYALGIAS: 1
SORE THROAT: 0
ABDOMINAL PAIN: 1
FEVER: 0
COUGH: 1
CHILLS: 1

## 2024-01-05 NOTE — TELEPHONE ENCOUNTER
On call for weekend-     Patient currently being treated for pneumonia with augmentin and doxycycline for CXR confirmed LLL infiltrate    She has covid and RSV swabs pending    Please notify patient of results over weekend   She is outside window for treatment even if covid positive (family just wanted to know)     If she has any difficulty tolerating those antibiotics can switch to a respiratory flouroquinolone

## 2024-01-05 NOTE — PROGRESS NOTES
Patient ID: Giselle Escobar is a 83 y.o. female.    Ear Cerumen Removal    Date/Time: 1/5/2024 1:36 PM    Performed by: Maricel Varghese CMA  Authorized by: Makenna Zafar DO    Consent:     Consent obtained:  Verbal    Consent given by:  Patient  Universal protocol:     Patient identity confirmed:  Verbally with patient  Procedure details:     Location:  L ear and R ear    Procedure type: irrigation      Procedure outcomes: cerumen removed    Post-procedure details:     Inspection:  Ear canal clear    Hearing quality:  Improved    Procedure completion:  Tolerated

## 2024-01-05 NOTE — PATIENT INSTRUCTIONS
Complete courses of Augmentin and Doxycycline for pneumonia    Labs today     RSV, Covid, Flu testing ordered    You will need a chest xray again in 1 month to follow up on the pneumonia

## 2024-01-05 NOTE — PROGRESS NOTES
Subjective   Patient ID: Giselle Escobar is a 83 y.o. female who presents for Follow-up (Pneumonia starting to fell better still SOB and fatigue /She is here with her son /Would like ears looked at ).    HPI     Son with her today --    She was seen by a NP through insurance company at her home on 1/1/24 for UTI and flu.   Tx with tamiflu (only took for 2 days) empirically as family members had tested positive for fluA and fosfomycin for UTI.   Urine culture positive > 100,000 klebsiella oxyloca  CXR was ordered on 1/3/24 due to moist wet productive cough, concern for pneumonia - showed left lower lobe atelectasis vs pneumonia- now on augmentin and doxycycline BID x 7 days ; she stopped tamiflu when she started the antibiotics   She is still quite tired, feeling a little better ; having some abd pain and indigestion with the antibiotics but thinks can still tolerate.    Dyspnea with exertion.   Cough worse at night, productive of clear sputum.   Denies chest pain.    No fevers, +chills.      She saw urology for urinary retention, had stringer catheter following hospitalization in Nov, UTI as well at that time - catheter was removed.    Has been urinating without difficulty now.    Still dealing with frequency and urgency.  Dysuria improved, slight  No hematuria. +nausea, from antibiotics.    No diarrhea.      Trouble hearing, wants ears checked.     CBC, BMP previously ordered- not done     She has known chronic anemia, MDS, CKD stage 4, Afib     Current Outpatient Medications   Medication Sig Dispense Refill    amLODIPine (Norvasc) 2.5 mg tablet Take 1 tablet (2.5 mg) by mouth once daily. Hold if Bp less than 110/60 per nursing facility      amoxicillin-pot clavulanate (Augmentin) 500-125 mg tablet Take 1 tablet (500 mg) by mouth 2 times a day.      ascorbic acid (Vitamin C) 500 mg chewable tablet Chew 1 tablet (500 mg) once daily.      calcitriol (Rocaltrol) 0.25 mcg capsule Take 1 capsule 3 times weekly 45 capsule 3     calcium carbonate-vitamin D3 600 mg-10 mcg (400 unit) tablet Take by mouth.      cyanocobalamin (Vitamin B-12) 1,000 mcg tablet Take 100 mcg by mouth once daily.      doxycycline (Vibramycin) 100 mg capsule       ferrous sulfate 325 (65 Fe) MG EC tablet Take 65 mg by mouth 2 times a day. Do not crush, chew, or split.      fosfomycin (Monurol) 3 gram packet TAKE 1 PACKET BY MOUTH AS DIRECTED MIX WHOLE PACKET IN 3-4 OZ OF WATER, DRINK ENTIRE CONTENT.      ipratropium-albuteroL (Duo-Neb) 0.5-2.5 mg/3 mL nebulizer solution USE 3 ML VIA NEBULIZER EVERY 4 HOURS AS NEEDED FO SHORTNESS OF BREATH      magnesium 250 mg tablet Take 1 tablet (250 mg) by mouth.      metoprolol succinate XL (Toprol-XL) 25 mg 24 hr tablet Take 0.5 tablets (12.5 mg) by mouth once daily.      mirtazapine (Remeron) 7.5 mg tablet TAKE 1 TABLET BY MOUTH EVERY NIGHT AT BEDTIME FOR POOR APPETITE      multivitamin tablet once every 24 hours.      omeprazole (PriLOSEC) 20 mg DR capsule TAKE 1 CAPSULE BY MOUTH ONCE  DAILY 90 capsule 3    ondansetron ODT (Zofran-ODT) 4 mg disintegrating tablet       rivaroxaban (Xarelto) 15 mg tablet once every 24 hours.      rosuvastatin (Crestor) 10 mg tablet Take 1 tablet (10 mg) by mouth once daily. 90 tablet 3     No current facility-administered medications for this visit.       Review of Systems   Constitutional:  Positive for chills and fatigue. Negative for fever.   HENT:  Negative for congestion and sore throat.    Respiratory:  Positive for cough and shortness of breath.    Cardiovascular:  Negative for chest pain and palpitations.   Gastrointestinal:  Positive for abdominal pain and nausea. Negative for diarrhea and vomiting.   Musculoskeletal:  Positive for myalgias.           Objective   /79 (BP Location: Right arm, Patient Position: Sitting, BP Cuff Size: Adult)   Pulse 93   Temp 36.6 °C (97.8 °F) (Temporal)   Resp 16   Wt 73.3 kg (161 lb 8 oz)   SpO2 97%   BMI 28.61 kg/m²     Physical  Exam      Constitutional: Well developed, well nourished, alert and in no acute distress.  Head and Face: NC/AT  Eyes: Normal external exam. Pupils equally round and reactive to light with normal accommodation and extraocular movements intact.  ENT: External inspection of ears normal, tympanic membranes obstructed by cerumen bilaterally.   Cardiovascular: Irregular rate and rhythm, normal S1 and S2, no murmurs, gallops, or rubs.   Pulmonary: No respiratory distress, lungs clear to auscultation bilaterally. No wheezes, rhonchi, rales.  Skin: Warm, well perfused, normal skin turgor and color.   Neurologic: Cranial nerves II-XII grossly intact.    Assessment/Plan   Problem List Items Addressed This Visit             ICD-10-CM    Hearing difficulty H91.90    Recurrent UTI N39.0     Other Visit Diagnoses         Codes    Pneumonia of left lower lobe due to infectious organism    -  Primary J18.9    Relevant Orders    XR chest 2 views    HCAP (healthcare-associated pneumonia)     J18.9    Relevant Orders    POCT Influenza A/B manually resulted (Completed)    Sars-CoV-2 PCR, Symptomatic    RSV PCR    Comprehensive Metabolic Panel    CBC and Auto Differential    Bilateral impacted cerumen     H61.23    Relevant Orders    Ear cerumen removal    Influenza A     J10.1          She is currently on day 5 of influenza A, with secondary pneumonia- on augmentin and doxyxycline  CURB65 score 1- 2.7% 30-day mortality.  PORT score Risk Class IV, 8.2-9.3% mortality. Hospitalization recommended based on risk.  We discussed outpatient vs inpatient admission based upon her medical co-morbidities- she is clinically stable and chooses to continue to treat this outpatient.  Both patient and son agreed to go to ER over weekend if clinically worsening.    She is having some GI side effects but thinks can still tolerate.  Advised her to call if worsening and can switch to a respiratory fluoroquinolone.   She will need chest xray repeated in 1  month to document resolution of pneumonia.   Family would like covid and RSV testing performed.      I was unable to completely remove the cerumen with use of magnification provided by an otoscope and an ear curette.  Cerumen irrigation was performed in the office with water and peroxide with success by the medical assistant.    The patient tolerated the procedure well.  Repeat examination shows that the tympanic membranes are normal without signs of infection.      Makenna Zafar, DO  1/5/2024

## 2024-01-06 LAB
ALBUMIN SERPL BCP-MCNC: 3.5 G/DL (ref 3.4–5)
ALP SERPL-CCNC: 116 U/L (ref 33–136)
ALT SERPL W P-5'-P-CCNC: 17 U/L (ref 7–45)
ANION GAP SERPL CALC-SCNC: 12 MMOL/L (ref 10–20)
AST SERPL W P-5'-P-CCNC: 24 U/L (ref 9–39)
BASOPHILS # BLD AUTO: 0.04 X10*3/UL (ref 0–0.1)
BASOPHILS NFR BLD AUTO: 0.7 %
BILIRUB SERPL-MCNC: 0.4 MG/DL (ref 0–1.2)
BUN SERPL-MCNC: 23 MG/DL (ref 6–23)
CALCIUM SERPL-MCNC: 9.2 MG/DL (ref 8.6–10.6)
CHLORIDE SERPL-SCNC: 105 MMOL/L (ref 98–107)
CO2 SERPL-SCNC: 28 MMOL/L (ref 21–32)
CREAT SERPL-MCNC: 1.36 MG/DL (ref 0.5–1.05)
EOSINOPHIL # BLD AUTO: 0.04 X10*3/UL (ref 0–0.4)
EOSINOPHIL NFR BLD AUTO: 0.7 %
ERYTHROCYTE [DISTWIDTH] IN BLOOD BY AUTOMATED COUNT: 17.1 % (ref 11.5–14.5)
GFR SERPL CREATININE-BSD FRML MDRD: 38 ML/MIN/1.73M*2
GLUCOSE SERPL-MCNC: 108 MG/DL (ref 74–99)
HCT VFR BLD AUTO: 38.4 % (ref 36–46)
HGB BLD-MCNC: 10.9 G/DL (ref 12–16)
IMM GRANULOCYTES # BLD AUTO: 0.01 X10*3/UL (ref 0–0.5)
IMM GRANULOCYTES NFR BLD AUTO: 0.2 % (ref 0–0.9)
LYMPHOCYTES # BLD AUTO: 1.94 X10*3/UL (ref 0.8–3)
LYMPHOCYTES NFR BLD AUTO: 33.7 %
MCH RBC QN AUTO: 27.7 PG (ref 26–34)
MCHC RBC AUTO-ENTMCNC: 28.4 G/DL (ref 32–36)
MCV RBC AUTO: 98 FL (ref 80–100)
MONOCYTES # BLD AUTO: 0.28 X10*3/UL (ref 0.05–0.8)
MONOCYTES NFR BLD AUTO: 4.9 %
NEUTROPHILS # BLD AUTO: 3.45 X10*3/UL (ref 1.6–5.5)
NEUTROPHILS NFR BLD AUTO: 59.8 %
NRBC BLD-RTO: 0 /100 WBCS (ref 0–0)
PLATELET # BLD AUTO: 255 X10*3/UL (ref 150–450)
POTASSIUM SERPL-SCNC: 3.9 MMOL/L (ref 3.5–5.3)
PROT SERPL-MCNC: 7 G/DL (ref 6.4–8.2)
RBC # BLD AUTO: 3.94 X10*6/UL (ref 4–5.2)
RSV RNA RESP QL NAA+PROBE: NOT DETECTED
SARS-COV-2 ORF1AB RESP QL NAA+PROBE: NOT DETECTED
SODIUM SERPL-SCNC: 141 MMOL/L (ref 136–145)
WBC # BLD AUTO: 5.8 X10*3/UL (ref 4.4–11.3)

## 2024-01-10 ENCOUNTER — PATIENT OUTREACH (OUTPATIENT)
Dept: CARE COORDINATION | Facility: CLINIC | Age: 84
End: 2024-01-10
Payer: MEDICARE

## 2024-01-10 NOTE — PROGRESS NOTES
Call placed regarding one month post discharge follow up call.  At time of outreach call the patient feels as if their condition has improved  since initial visit with PCP or specialist.  Questions or concerns regarding recovery period addressed at this time. Finishing antibiotics for pneumonia today, then will have repeat chest xray next month to assess healing. Feeling better.  Reviewed any PCP or specialists progress notes/labs/radiology reports if applicable and addressed any questions or concerns.

## 2024-01-12 ENCOUNTER — TELEPHONE (OUTPATIENT)
Dept: PRIMARY CARE | Facility: CLINIC | Age: 84
End: 2024-01-12

## 2024-01-12 NOTE — TELEPHONE ENCOUNTER
Patient aware.  She did not want to sit and wait in an ER for hours    I advised to call 911 and be taken by squad

## 2024-01-12 NOTE — TELEPHONE ENCOUNTER
Patients  called back to let us know her BP is now normal, she does not want to go to the ER    I advised a BP/HR that low should be evaluated at an emergency room and I am unsure how else to help her if they do not want to take the advice of her PCP    They will try to talk her into going

## 2024-01-12 NOTE — TELEPHONE ENCOUNTER
Patients  is calling to report a blood pressure of 85/35  HR 43    She is very dizzy, nauseated, lightheaded     Family friend/caretaker is also at their house and can talk to her as well (her name is Nidhi)     She retook BP while we were on the phone and came back as:  88/39 HR 87

## 2024-01-15 ENCOUNTER — DOCUMENTATION (OUTPATIENT)
Dept: PRIMARY CARE | Facility: CLINIC | Age: 84
End: 2024-01-15
Payer: MEDICARE

## 2024-01-15 ENCOUNTER — PATIENT OUTREACH (OUTPATIENT)
Dept: CARE COORDINATION | Facility: CLINIC | Age: 84
End: 2024-01-15
Payer: MEDICARE

## 2024-01-15 NOTE — PROGRESS NOTES
Discharge Facility: [facility]  Discharge Diagnosis: dizziness, near syncope  Admission Date: 1/12/24  Discharge Date: 1/13/24    PCP Appointment Date: TBD no availability, message sent to office, prefers next week as this week is too cold  Specialist Appointment Date:  N/A  Hospital Encounter and Summary: Linked   See discharge assessment below for further details    Engagement  Call Start Time: 1510 (1/15/2024  3:19 PM)    Medications  Medications reviewed with patient/caregiver?: Yes (1/15/2024  3:19 PM)  Is the patient having any side effects they believe may be caused by any medication additions or changes?: No (1/15/2024  3:19 PM)  Does the patient have all medications ordered at discharge?: Yes (1/15/2024  3:19 PM)  Care Management Interventions: No intervention needed (1/15/2024  3:19 PM)  Prescription Comments: No medication changes, just told to only take amlodipine if SBP is above 130. Patient states she rarely has to take it since BP runs on the lower side towards 100 Systolic. (1/15/2024  3:19 PM)  Is the patient taking all medications as directed (includes completed medication regime)?: Yes (1/15/2024  3:19 PM)  Care Management Interventions: Provided patient education (1/15/2024  3:19 PM)  Medication Comments: No concerns with medications. (1/15/2024  3:19 PM)    Appointments  Does the patient have a primary care provider?: Yes (1/15/2024  3:19 PM)  Care Management Interventions: -- (Message sent to office, no availability on PCP schedule) (1/15/2024  3:19 PM)  Has the patient kept scheduled appointments due by today?: Yes (1/15/2024  3:19 PM)    Patient Teaching  Does the patient have access to their discharge instructions?: Yes (1/15/2024  3:19 PM)  Care Management Interventions: Reviewed instructions with patient (1/15/2024  3:19 PM)  What is the patient's perception of their health status since discharge?: Improving (1/15/2024  3:19 PM)  Is the patient/caregiver able to teach back the hierarchy of  who to call/visit for symptoms/problems? PCP, Specialist, Home Health nurse, Urgent Care, ED, 911: Yes (1/15/2024  3:19 PM)  Patient/Caregiver Education Comments: Aware to monitor BP, continue to drink fluids, take caution when ambulating or changing positions. (1/15/2024  3:19 PM)    Wrap Up  Wrap Up Additional Comments: Spoke with Giselle who states she is feeling better, had a lower BP this am but no nausea or dizziness since she had that episode last Friday. She will continue to monitor BP and call with concerns. (1/15/2024  3:19 PM)  Call End Time: 1516 (1/15/2024  3:19 PM)

## 2024-01-16 ENCOUNTER — TELEPHONE (OUTPATIENT)
Dept: PRIMARY CARE | Facility: CLINIC | Age: 84
End: 2024-01-16
Payer: MEDICARE

## 2024-01-16 NOTE — TELEPHONE ENCOUNTER
Spoke with patient, she stated that she is doing okay and better.I asked if we can get her scheduled for a follow up. She declined and does not want to come out in the weather. Will call back if symptoms return

## 2024-01-16 NOTE — TELEPHONE ENCOUNTER
----- Message from Marisol Blackwood RN sent at 1/15/2024  3:30 PM EST -----  Regarding: hosp fu preferably next week  This patient was discharged from:  [facility]  Discharge diagnosis:   near syncope  Date of discharge:       1/13/24  No PCP appointments available within 14 days of discharge.   Please reach out to patient and schedule an appointment within 7-13 days from discharge date, prefers next week if able due to cold temps this week, thank you!

## 2024-01-30 ENCOUNTER — PATIENT OUTREACH (OUTPATIENT)
Dept: CARE COORDINATION | Facility: CLINIC | Age: 84
End: 2024-01-30
Payer: MEDICARE

## 2024-01-30 NOTE — PROGRESS NOTES
Call regarding two week check in post hospital stay.  At time of outreach call the patient feels as if their condition has returned to baseline since last visit.  Reviewed the hospital stay with the pt and addressed any questions or concerns.

## 2024-02-02 ENCOUNTER — TELEPHONE (OUTPATIENT)
Dept: PRIMARY CARE | Facility: CLINIC | Age: 84
End: 2024-02-02

## 2024-02-02 ENCOUNTER — APPOINTMENT (OUTPATIENT)
Dept: PRIMARY CARE | Facility: CLINIC | Age: 84
End: 2024-02-02
Payer: MEDICARE

## 2024-02-02 ENCOUNTER — LAB (OUTPATIENT)
Dept: LAB | Facility: LAB | Age: 84
End: 2024-02-02
Payer: MEDICARE

## 2024-02-02 ENCOUNTER — OFFICE VISIT (OUTPATIENT)
Dept: PRIMARY CARE | Facility: CLINIC | Age: 84
End: 2024-02-02
Payer: MEDICARE

## 2024-02-02 ENCOUNTER — HOSPITAL ENCOUNTER (OUTPATIENT)
Dept: VASCULAR MEDICINE | Facility: CLINIC | Age: 84
Discharge: HOME | End: 2024-02-02
Payer: MEDICARE

## 2024-02-02 ENCOUNTER — HOSPITAL ENCOUNTER (OUTPATIENT)
Dept: RADIOLOGY | Facility: CLINIC | Age: 84
Discharge: HOME | End: 2024-02-02
Payer: MEDICARE

## 2024-02-02 VITALS
WEIGHT: 191.8 LBS | DIASTOLIC BLOOD PRESSURE: 85 MMHG | OXYGEN SATURATION: 94 % | SYSTOLIC BLOOD PRESSURE: 140 MMHG | HEART RATE: 88 BPM | BODY MASS INDEX: 33.98 KG/M2 | TEMPERATURE: 97.1 F

## 2024-02-02 DIAGNOSIS — R60.0 LOCALIZED EDEMA: ICD-10-CM

## 2024-02-02 DIAGNOSIS — M25.562 ACUTE PAIN OF LEFT KNEE: Primary | ICD-10-CM

## 2024-02-02 DIAGNOSIS — M79.89 LEG SWELLING: ICD-10-CM

## 2024-02-02 DIAGNOSIS — M79.89 LEG SWELLING: Primary | ICD-10-CM

## 2024-02-02 LAB
ANION GAP SERPL CALC-SCNC: 14 MMOL/L (ref 10–20)
BUN SERPL-MCNC: 26 MG/DL (ref 6–23)
CALCIUM SERPL-MCNC: 9.2 MG/DL (ref 8.6–10.6)
CHLORIDE SERPL-SCNC: 109 MMOL/L (ref 98–107)
CO2 SERPL-SCNC: 24 MMOL/L (ref 21–32)
CREAT SERPL-MCNC: 1.37 MG/DL (ref 0.5–1.05)
CRP SERPL-MCNC: 0.87 MG/DL
EGFRCR SERPLBLD CKD-EPI 2021: 38 ML/MIN/1.73M*2
ERYTHROCYTE [DISTWIDTH] IN BLOOD BY AUTOMATED COUNT: 16 % (ref 11.5–14.5)
ERYTHROCYTE [SEDIMENTATION RATE] IN BLOOD BY WESTERGREN METHOD: 32 MM/H (ref 0–30)
GLUCOSE SERPL-MCNC: 82 MG/DL (ref 74–99)
HCT VFR BLD AUTO: 35.1 % (ref 36–46)
HGB BLD-MCNC: 10.8 G/DL (ref 12–16)
MCH RBC QN AUTO: 28.9 PG (ref 26–34)
MCHC RBC AUTO-ENTMCNC: 30.8 G/DL (ref 32–36)
MCV RBC AUTO: 94 FL (ref 80–100)
NRBC BLD-RTO: 0 /100 WBCS (ref 0–0)
PLATELET # BLD AUTO: 248 X10*3/UL (ref 150–450)
POTASSIUM SERPL-SCNC: 4 MMOL/L (ref 3.5–5.3)
RBC # BLD AUTO: 3.74 X10*6/UL (ref 4–5.2)
SODIUM SERPL-SCNC: 143 MMOL/L (ref 136–145)
URATE SERPL-MCNC: 4.7 MG/DL (ref 2.3–6.7)
WBC # BLD AUTO: 5.6 X10*3/UL (ref 4.4–11.3)

## 2024-02-02 PROCEDURE — 1159F MED LIST DOCD IN RCRD: CPT | Performed by: FAMILY MEDICINE

## 2024-02-02 PROCEDURE — 99214 OFFICE O/P EST MOD 30 MIN: CPT | Performed by: FAMILY MEDICINE

## 2024-02-02 PROCEDURE — 1160F RVW MEDS BY RX/DR IN RCRD: CPT | Performed by: FAMILY MEDICINE

## 2024-02-02 PROCEDURE — 93971 EXTREMITY STUDY: CPT | Performed by: INTERNAL MEDICINE

## 2024-02-02 PROCEDURE — 36415 COLL VENOUS BLD VENIPUNCTURE: CPT

## 2024-02-02 PROCEDURE — 73560 X-RAY EXAM OF KNEE 1 OR 2: CPT | Mod: RT

## 2024-02-02 PROCEDURE — 3077F SYST BP >= 140 MM HG: CPT | Performed by: FAMILY MEDICINE

## 2024-02-02 PROCEDURE — 85652 RBC SED RATE AUTOMATED: CPT

## 2024-02-02 PROCEDURE — 1125F AMNT PAIN NOTED PAIN PRSNT: CPT | Performed by: FAMILY MEDICINE

## 2024-02-02 PROCEDURE — 1036F TOBACCO NON-USER: CPT | Performed by: FAMILY MEDICINE

## 2024-02-02 PROCEDURE — 85027 COMPLETE CBC AUTOMATED: CPT

## 2024-02-02 PROCEDURE — 86140 C-REACTIVE PROTEIN: CPT

## 2024-02-02 PROCEDURE — 80048 BASIC METABOLIC PNL TOTAL CA: CPT

## 2024-02-02 PROCEDURE — 84550 ASSAY OF BLOOD/URIC ACID: CPT

## 2024-02-02 PROCEDURE — 93971 EXTREMITY STUDY: CPT

## 2024-02-02 PROCEDURE — 3079F DIAST BP 80-89 MM HG: CPT | Performed by: FAMILY MEDICINE

## 2024-02-02 PROCEDURE — 73560 X-RAY EXAM OF KNEE 1 OR 2: CPT | Mod: RIGHT SIDE | Performed by: RADIOLOGY

## 2024-02-02 RX ORDER — PREDNISONE 10 MG/1
20 TABLET ORAL DAILY
Qty: 10 TABLET | Refills: 0 | Status: SHIPPED | OUTPATIENT
Start: 2024-02-02 | End: 2024-02-03 | Stop reason: SDUPTHER

## 2024-02-02 RX ORDER — ATORVASTATIN CALCIUM 20 MG/1
TABLET, FILM COATED ORAL
COMMUNITY
Start: 2024-01-25 | End: 2024-02-16 | Stop reason: ALTCHOICE

## 2024-02-02 ASSESSMENT — ENCOUNTER SYMPTOMS
DYSURIA: 0
SHORTNESS OF BREATH: 0
CHEST TIGHTNESS: 0
FEVER: 0
COUGH: 0

## 2024-02-02 NOTE — TELEPHONE ENCOUNTER
Called pt  .results reviewed.   No clot noted.  Has a cyst.  Question of nonhealing of femur fx.     Recommend  she contact orthopedics.  No nsaids, as she is on a blood thinner.  Prednisone, short course.  Rest from painful activities.  Notify if pain not improving  .  See orthopedics  .

## 2024-02-02 NOTE — PROGRESS NOTES
Subjective   Patient ID: Giselle Escobar is a 83 y.o. female who presents for Joint Swelling (Left knee swelling. Pt states that it is ore swollen than it has ever been. Pt is in PT and has a bruise. Pt states that she had knee redness as well. ).  HPI    Here w son .     Weds night  she noticed swelling left knee,  and pain w movement and wt bearing. Continued Thursday and today , so made appt.  Swelling went down  , and she noticed dark bruise where the swelling was.  This is on the medial side of knee.  She denies fall/ bumping it/ twisting her knee.  She is on a blood thinner for afib.      Significant recent hx.    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     About a week after the CVA ,  fractured her left femur, and patella, had surgery .    Complicated by surgical wound dehiscence requiring wound care.     1 month ago bacterial  Pneumonia and influenza      Current Outpatient Medications   Medication Sig Dispense Refill    amLODIPine (Norvasc) 2.5 mg tablet Take 1 tablet (2.5 mg) by mouth once daily. Hold if Bp less than 130 systolic      ascorbic acid (Vitamin C) 500 mg chewable tablet Chew 1 tablet (500 mg) once daily.      atorvastatin (Lipitor) 20 mg tablet       calcitriol (Rocaltrol) 0.25 mcg capsule Take 1 capsule 3 times weekly 45 capsule 3    calcium carbonate-vitamin D3 600 mg-10 mcg (400 unit) tablet Take by mouth.      cyanocobalamin (Vitamin B-12) 1,000 mcg tablet Take 100 mcg by mouth once daily.      ferrous sulfate 325 (65 Fe) MG EC tablet Take 65 mg by mouth 2 times a day. Do not crush, chew, or split.      ipratropium-albuteroL (Duo-Neb) 0.5-2.5 mg/3 mL nebulizer solution USE 3 ML VIA NEBULIZER EVERY 4 HOURS AS NEEDED FO SHORTNESS OF BREATH      magnesium 250 mg tablet Take 1 tablet (250 mg) by mouth.      metoprolol succinate XL (Toprol-XL) 25 mg 24 hr tablet Take 0.5 tablets (12.5 mg) by mouth once daily.      mirtazapine (Remeron) 7.5 mg tablet TAKE 1 TABLET BY MOUTH EVERY NIGHT AT  BEDTIME FOR POOR APPETITE      multivitamin tablet once every 24 hours.      omeprazole (PriLOSEC) 20 mg DR capsule TAKE 1 CAPSULE BY MOUTH ONCE  DAILY 90 capsule 3    rivaroxaban (Xarelto) 15 mg tablet once every 24 hours.      rosuvastatin (Crestor) 10 mg tablet Take 1 tablet (10 mg) by mouth once daily. 90 tablet 3     No current facility-administered medications for this visit.        Review of Systems   Constitutional:  Negative for fever.   Respiratory:  Negative for cough, chest tightness and shortness of breath.    Cardiovascular:  Positive for leg swelling. Negative for chest pain.   Genitourinary:  Negative for dysuria.       Objective   /85 (BP Location: Right arm, Patient Position: Sitting, BP Cuff Size: Large adult)   Pulse 88   Temp 36.2 °C (97.1 °F) (Temporal)   Wt 87 kg (191 lb 12.8 oz)   SpO2 94%   BMI 33.98 kg/m²     Physical Exam  Constitutional:       General: She is not in acute distress.     Comments: Anxious affect .    Sitting in wheelchair .    Cardiovascular:      Rate and Rhythm: Normal rate and regular rhythm.   Pulmonary:      Effort: Pulmonary effort is normal.      Breath sounds: Normal breath sounds. No wheezing or rales.   Musculoskeletal:      Comments: Bilateral lower extremities :  left leg appears larger compared to right .  Right leg has many healed scars ,  right anterior lower leg. And a right surgical scar over the knee.     Left leg.  Non pitting ankle to knee. Moderate soft tissue swelling,  right medial knee.  3 cm ecchymosis  noted in the area of swelling. No redness. No warmth .  No pain on palpation .   Skin wound with scabs . Not open. Dry .  Horizontal.  This area is not tender . No drng .     Flexion and extension of knee- can do, but painful .    Neurological:      Mental Status: She is alert.       Left popliteal swelling, no warmth, redness, or mass.     Labs  1/13/24  GFR 40  H/Ht  9/33  Assessment/Plan   Problem List Items Addressed This Visit     None  Visit Diagnoses       Leg swelling    -  Primary    Relevant Orders    CBC    C-reactive protein    Uric acid    Sedimentation Rate    XR knee right 1-2 views    Basic metabolic panel    Lower extremity venous duplex left          Acute left knee swelling: Workup ordered recent lab work shows low GFR low hemoglobin hematocrit.  Will recheck these along with additional lab work differential includes septic arthritis, gout, osteoarthritis, blood clot.    Followup  pending results of testing   MILA Osman MD

## 2024-02-03 ENCOUNTER — APPOINTMENT (OUTPATIENT)
Dept: PRIMARY CARE | Facility: CLINIC | Age: 84
End: 2024-02-03
Payer: MEDICARE

## 2024-02-03 DIAGNOSIS — M25.562 ACUTE PAIN OF LEFT KNEE: ICD-10-CM

## 2024-02-03 RX ORDER — PREDNISONE 10 MG/1
20 TABLET ORAL DAILY
Qty: 10 TABLET | Refills: 0 | Status: SHIPPED | OUTPATIENT
Start: 2024-02-03 | End: 2024-02-16 | Stop reason: ALTCHOICE

## 2024-02-06 ENCOUNTER — TELEPHONE (OUTPATIENT)
Dept: PRIMARY CARE | Facility: CLINIC | Age: 84
End: 2024-02-06

## 2024-02-06 NOTE — TELEPHONE ENCOUNTER
Patient and her  are aware, they had talked to another doctor as well and they recommended the same thing. Karson her  had said on the phone her leg was turning purple and blue and that he thinks she should go to ED and I agreed because of the new onset of symptoms. Sending to PCP just to make aware.

## 2024-02-06 NOTE — TELEPHONE ENCOUNTER
Patient was seen last week by Dr. Osman for acute knee pain, DVT ruled out and labs done, she was put on prednisone    States her right ankle is now very swollen, red, warm to touch and her temperature last night was over 99, pain rated 7/10 during ambulation, knee now has minimal swelling    No appointments available in the next 24 hours for any provider, please advise

## 2024-02-12 ENCOUNTER — PATIENT OUTREACH (OUTPATIENT)
Dept: CARE COORDINATION | Facility: CLINIC | Age: 84
End: 2024-02-12
Payer: MEDICARE

## 2024-02-12 RX ORDER — FUROSEMIDE 20 MG/1
20 TABLET ORAL DAILY
COMMUNITY
Start: 2024-02-11

## 2024-02-12 RX ORDER — LINEZOLID 600 MG/1
600 TABLET, FILM COATED ORAL 2 TIMES DAILY
COMMUNITY
Start: 2024-02-11 | End: 2024-02-19

## 2024-02-12 NOTE — PROGRESS NOTES
Discharge Facility: [facility]  Discharge Diagnosis: cellulitis  Admission Date: 2/6/24  Discharge Date: 2/11/24    PCP Appointment Date: 2/16/24  Specialist Appointment Date: N/A  Hospital Encounter and Summary: Linked   See discharge assessment below for further details    Engagement  Call Start Time: 1315 (2/12/2024  1:24 PM)    Medications  Medications reviewed with patient/caregiver?: Yes (2/12/2024  1:24 PM)  Is the patient having any side effects they believe may be caused by any medication additions or changes?: No (2/12/2024  1:24 PM)  Does the patient have all medications ordered at discharge?: Yes (2/12/2024  1:24 PM)  Care Management Interventions: Provided patient education (2/12/2024  1:24 PM)  Prescription Comments: On furosemide 20mg daily, lactobacillis, zyvox 600mg every 12 hours for 8 more days (2/12/2024  1:24 PM)  Is the patient taking all medications as directed (includes completed medication regime)?: Yes (2/12/2024  1:24 PM)  Care Management Interventions: Provided patient education (2/12/2024  1:24 PM)  Medication Comments: No issues obtaining or affording medications. (2/12/2024  1:24 PM)    Appointments  Does the patient have a primary care provider?: Yes (2/12/2024  1:24 PM)  Care Management Interventions: Verified appointment date/time/provider (2/12/2024  1:24 PM)  Has the patient kept scheduled appointments due by today?: Yes (2/12/2024  1:24 PM)  Care Management Interventions: Advised patient to keep appointment (2/12/2024  1:24 PM)    Self Management  What is the home health agency?: [facility] (2/12/2024  1:24 PM)  Has home health visited the patient within 72 hours of discharge?: Call prior to 72 hours (2/12/2024  1:24 PM)  Home Health Interventions: Other (Comment) (Provided patient with accessible [facility] phone number) (2/12/2024  1:24 PM)  What Durable Medical Equipment (DME) was ordered?: dressing supplies (2/12/2024  1:24 PM)  Has all Durable Medical Equipment (DME) been  delivered?: Yes (2/12/2024  1:24 PM)    Patient Teaching  Does the patient have access to their discharge instructions?: Yes (2/12/2024  1:24 PM)  Care Management Interventions: Reviewed instructions with patient (2/12/2024  1:24 PM)  What is the patient's perception of their health status since discharge?: Improving (2/12/2024  1:24 PM)  Is the patient/caregiver able to teach back the hierarchy of who to call/visit for symptoms/problems? PCP, Specialist, Home Health nurse, Urgent Care, ED, 911: Yes (2/12/2024  1:24 PM)  Patient/Caregiver Education Comments: Aware to continue daily dressing changes and ACE wrap for compression on R leg, keep leg elevated when sitting as much as possible. (2/12/2024  1:24 PM)    Wrap Up  Wrap Up Additional Comments: Giselle is doing well since coming home, reviewed plan of care and medication schedule. No other concerns at this time. Has frequent diarrhea in the hospital but that has resolved. (2/12/2024  1:24 PM)  Call End Time: 1325 (2/12/2024  1:24 PM)

## 2024-02-15 NOTE — PROGRESS NOTES
"  The patient is here for a hospital follow up.  Hospital records were reviewed prior to visit, including relevant labs, imaging findings, consultant notes, and discharge summary.  Medications were reconciled and are current, reviewed today.    She was admitted  -  for RLE cellulitis.  She was initially tx with IV vancomycin and zosyn and is now on Zyvox BID x 10 days.  Seen by ID, had some oozing of serous fluid from RLE which is being treated with compresson bandages.  She is doing wraps with kerlix and ACE wrap- changes daily.  She was also started on lasix 20 mg once daily.  She has home health coming to home.  U/S neg DVT.   She has been tired, BP is low- since getting home   Dry mouth   No pain in leg   Swelling much better   No more weeping of fluid   She states it looks good   Resolved erythema   No drainage   She is changing the ace wrap daily     Patient: Giselle Escobar  : 1940  PCP: Makenna Zafar DO  MRN: [id number]  Program: No linked episodes     Giselle Escobar is a 83 y.o. female presenting today for follow-up after being discharged from the hospital 5 days ago. The main problem requiring admission was cellulitis. The discharge summary and/or Transitional Care Management documentation was reviewed. Medication reconciliation was performed as indicated via the \"[place] as Reviewed\" timestamp.     Giselle Escobar was contacted by Transitional Care Management services two days after her discharge. This encounter and supporting documentation was reviewed.    The complexity of medical decision making for this patient's transitional care is Moderate.     Physical Exam    Constitutional: Well developed, well nourished, alert and in no acute distress.  Head and Face: NC/AT  Eyes: Normal external exam.   Cardiovascular: Irregular rate and rhythm, normal S1 and S2, no murmurs, gallops, or rubs.   Pulmonary: No respiratory distress, lungs clear to auscultation bilaterally. No wheezes, rhonchi, " rales.  Extremities: RLE with ACE-wrap bandage.  Trace swelling bilaterally.  No erythema, warmth, drainage.    Skin: Warm, well perfused, normal skin turgor and color.   Neurologic: Cranial nerves II-XII grossly intact.       Assessment/Plan   Problem List Items Addressed This Visit             ICD-10-CM    Bilateral edema of lower extremity R60.0     Much improved with lasix and ace compression wrap - continue          Hypertension with renal disease I12.9     Stop amlodipine due to low BP           Other Visit Diagnoses         Codes    Hospital discharge follow-up    -  Primary Z09    Cellulitis of right lower extremity     L03.115    Much improved  Complete course antibiotics             Review of Systems   Constitutional:  Positive for fatigue. Negative for fever.   Cardiovascular:  Positive for leg swelling.   Skin:  Negative for color change, rash and wound.       Family History   Problem Relation Name Age of Onset    Diabetes Mother Dd     Heart attack Mother Dd     Heart attack Father      ALS Son         Engagement  Call Start Time: 1315 (2/12/2024  1:24 PM)    Medications  Medications reviewed with patient/caregiver?: Yes (2/12/2024  1:24 PM)  Is the patient having any side effects they believe may be caused by any medication additions or changes?: No (2/12/2024  1:24 PM)  Does the patient have all medications ordered at discharge?: Yes (2/12/2024  1:24 PM)  Care Management Interventions: Provided patient education (2/12/2024  1:24 PM)  Prescription Comments: On furosemide 20mg daily, lactobacillis, zyvox 600mg every 12 hours for 8 more days (2/12/2024  1:24 PM)  Is the patient taking all medications as directed (includes completed medication regime)?: Yes (2/12/2024  1:24 PM)  Care Management Interventions: Provided patient education (2/12/2024  1:24 PM)  Medication Comments: No issues obtaining or affording medications. (2/12/2024  1:24 PM)    Appointments  Does the patient have a primary care  provider?: Yes (2/12/2024  1:24 PM)  Care Management Interventions: Verified appointment date/time/provider (2/12/2024  1:24 PM)  Has the patient kept scheduled appointments due by today?: Yes (2/12/2024  1:24 PM)  Care Management Interventions: Advised patient to keep appointment (2/12/2024  1:24 PM)    Self Management  What is the home health agency?: [facility] (2/12/2024  1:24 PM)  Has home health visited the patient within 72 hours of discharge?: Call prior to 72 hours (2/12/2024  1:24 PM)  Home Health Interventions: Other (Comment) (Provided patient with accessible [facility] phone number) (2/12/2024  1:24 PM)  What Durable Medical Equipment (DME) was ordered?: dressing supplies (2/12/2024  1:24 PM)  Has all Durable Medical Equipment (DME) been delivered?: Yes (2/12/2024  1:24 PM)    Patient Teaching  Does the patient have access to their discharge instructions?: Yes (2/12/2024  1:24 PM)  Care Management Interventions: Reviewed instructions with patient (2/12/2024  1:24 PM)  What is the patient's perception of their health status since discharge?: Improving (2/12/2024  1:24 PM)  Is the patient/caregiver able to teach back the hierarchy of who to call/visit for symptoms/problems? PCP, Specialist, Home Health nurse, Urgent Care, ED, 911: Yes (2/12/2024  1:24 PM)  Patient/Caregiver Education Comments: Aware to continue daily dressing changes and ACE wrap for compression on R leg, keep leg elevated when sitting as much as possible. (2/12/2024  1:24 PM)    Wrap Up  Wrap Up Additional Comments: Giselle is doing well since coming home, reviewed plan of care and medication schedule. No other concerns at this time. Has frequent diarrhea in the hospital but that has resolved. (2/12/2024  1:24 PM)  Call End Time: 1325 (2/12/2024  1:24 PM)        No follow-ups on file.    Makenna Zafar, DO  2/16/2024

## 2024-02-16 ENCOUNTER — OFFICE VISIT (OUTPATIENT)
Dept: PRIMARY CARE | Facility: CLINIC | Age: 84
End: 2024-02-16
Payer: MEDICARE

## 2024-02-16 VITALS
BODY MASS INDEX: 26.48 KG/M2 | DIASTOLIC BLOOD PRESSURE: 64 MMHG | OXYGEN SATURATION: 94 % | WEIGHT: 149.5 LBS | HEART RATE: 87 BPM | TEMPERATURE: 96.6 F | RESPIRATION RATE: 16 BRPM | SYSTOLIC BLOOD PRESSURE: 94 MMHG

## 2024-02-16 DIAGNOSIS — I12.9 HYPERTENSION WITH RENAL DISEASE: ICD-10-CM

## 2024-02-16 DIAGNOSIS — L03.115 CELLULITIS OF RIGHT LOWER EXTREMITY: ICD-10-CM

## 2024-02-16 DIAGNOSIS — R60.0 BILATERAL EDEMA OF LOWER EXTREMITY: ICD-10-CM

## 2024-02-16 DIAGNOSIS — Z09 HOSPITAL DISCHARGE FOLLOW-UP: Primary | ICD-10-CM

## 2024-02-16 PROCEDURE — 99495 TRANSJ CARE MGMT MOD F2F 14D: CPT | Performed by: FAMILY MEDICINE

## 2024-02-16 PROCEDURE — 1125F AMNT PAIN NOTED PAIN PRSNT: CPT | Performed by: FAMILY MEDICINE

## 2024-02-16 PROCEDURE — 1159F MED LIST DOCD IN RCRD: CPT | Performed by: FAMILY MEDICINE

## 2024-02-16 PROCEDURE — 1160F RVW MEDS BY RX/DR IN RCRD: CPT | Performed by: FAMILY MEDICINE

## 2024-02-16 PROCEDURE — 3074F SYST BP LT 130 MM HG: CPT | Performed by: FAMILY MEDICINE

## 2024-02-16 PROCEDURE — 1036F TOBACCO NON-USER: CPT | Performed by: FAMILY MEDICINE

## 2024-02-16 PROCEDURE — 3078F DIAST BP <80 MM HG: CPT | Performed by: FAMILY MEDICINE

## 2024-02-16 ASSESSMENT — ENCOUNTER SYMPTOMS
FATIGUE: 1
FEVER: 0
COLOR CHANGE: 0
WOUND: 0

## 2024-02-22 ENCOUNTER — PATIENT OUTREACH (OUTPATIENT)
Dept: CARE COORDINATION | Facility: CLINIC | Age: 84
End: 2024-02-22
Payer: MEDICARE

## 2024-02-22 NOTE — PROGRESS NOTES
Call regarding appt. with PCP on 2/16/24 after hospitalization.  At time of outreach call the patient's spouse Karson says patient has been stable since last visit.  Reviewed the PCP appointment with the pt and addressed any questions or concerns.  Karson states Giselle's BP yesterday was down to 70 systolic, was about to call Dr Zafar today but BP was 100 systolic today. Finished antibiotics for cellulitis last night. Explained to spouse that with infection sometimes BP can drop a little but to call with any concerning low numbers if this happens again. Giselle's leg is healing nicely, says home health has been in few times a week. Verified that amlodipine was stopped at visit with Dr Zafar. Introduced CCM program to spouse, he states this is something they may be interested in due to recent readmissions and trouble stabilizing BP at times, more frequent check ins may be beneficial. Referral placed.

## 2024-02-23 ENCOUNTER — PATIENT OUTREACH (OUTPATIENT)
Dept: PRIMARY CARE | Facility: CLINIC | Age: 84
End: 2024-02-23
Payer: MEDICARE

## 2024-02-23 DIAGNOSIS — L03.115 CELLULITIS OF RIGHT LOWER EXTREMITY: ICD-10-CM

## 2024-02-23 DIAGNOSIS — R60.0 BILATERAL EDEMA OF LOWER EXTREMITY: ICD-10-CM

## 2024-02-23 DIAGNOSIS — I12.9 HYPERTENSION WITH RENAL DISEASE: ICD-10-CM

## 2024-02-26 ENCOUNTER — PATIENT OUTREACH (OUTPATIENT)
Dept: PRIMARY CARE | Facility: CLINIC | Age: 84
End: 2024-02-26

## 2024-02-26 ENCOUNTER — PATIENT OUTREACH (OUTPATIENT)
Dept: PRIMARY CARE | Facility: CLINIC | Age: 84
End: 2024-02-26
Payer: MEDICARE

## 2024-02-26 VITALS — WEIGHT: 149 LBS | BODY MASS INDEX: 26.39 KG/M2

## 2024-02-26 DIAGNOSIS — I48.0 PAROXYSMAL ATRIAL FIBRILLATION (MULTI): ICD-10-CM

## 2024-02-26 DIAGNOSIS — I12.9 HYPERTENSION WITH RENAL DISEASE: ICD-10-CM

## 2024-02-26 DIAGNOSIS — L03.115 CELLULITIS OF RIGHT LOWER EXTREMITY: ICD-10-CM

## 2024-02-26 DIAGNOSIS — I10 ESSENTIAL HYPERTENSION, BENIGN: ICD-10-CM

## 2024-02-26 DIAGNOSIS — R60.0 BILATERAL EDEMA OF LOWER EXTREMITY: ICD-10-CM

## 2024-02-26 PROCEDURE — 99490 CHRNC CARE MGMT STAFF 1ST 20: CPT | Performed by: FAMILY MEDICINE

## 2024-02-26 PROCEDURE — 99439 CHRNC CARE MGMT STAF EA ADDL: CPT | Performed by: FAMILY MEDICINE

## 2024-02-26 ASSESSMENT — ACTIVITIES OF DAILY LIVING (ADL): BATHING: YES

## 2024-02-26 NOTE — PROGRESS NOTES
Patient introduced to Chronic Care Management Services   Patient opted into services on (02/23/24  Services will be performed under the direction of PCP: dominique  Patient has planned (AWV or Follow-up) on (date) 4/11/24  I have discussed the nature and availability of the service with the patient, the patient's responsibility for potential cost sharing, only one practitioner furnishing services during a calendar month, and the right to stop services at any time.

## 2024-03-22 ENCOUNTER — PATIENT OUTREACH (OUTPATIENT)
Dept: PRIMARY CARE | Facility: CLINIC | Age: 84
End: 2024-03-22
Payer: MEDICARE

## 2024-03-22 DIAGNOSIS — I12.9 HYPERTENSION WITH RENAL DISEASE: ICD-10-CM

## 2024-03-22 DIAGNOSIS — M79.89 LEG SWELLING: ICD-10-CM

## 2024-03-22 DIAGNOSIS — E78.5 HYPERLIPIDEMIA, UNSPECIFIED HYPERLIPIDEMIA TYPE: ICD-10-CM

## 2024-03-22 PROCEDURE — 99490 CHRNC CARE MGMT STAFF 1ST 20: CPT | Performed by: FAMILY MEDICINE

## 2024-03-22 NOTE — PROGRESS NOTES
Spoke with Giselle and spouse.  They stated they are eating well.        SKIN INTEGRITY   She is healing well. Her skin is paper thin and she bruises easily.     HTN  Her pressures have been very good.usually 120/60     EDEMA  Her ankles are much improved.   No issues with obtaing or taking medications.     Re-provided contact information for them. Encouraged them to reach out for any concerns or issues.     Questions answered and support given.

## 2024-04-11 ENCOUNTER — APPOINTMENT (OUTPATIENT)
Dept: PRIMARY CARE | Facility: CLINIC | Age: 84
End: 2024-04-11
Payer: MEDICARE

## 2024-04-11 PROBLEM — G89.29 CHRONIC PAIN: Status: ACTIVE | Noted: 2024-04-11

## 2024-04-11 PROBLEM — S72.90XA CLOSED FRACTURE OF FEMUR (MULTI): Status: ACTIVE | Noted: 2024-04-11

## 2024-04-11 NOTE — PROGRESS NOTES
Subjective   Patient ID: Giselle Escobar is a 83 y.o. female who presents for No chief complaint on file..    HPI     Here for follow up     CKD stage 4- Dr. Marte's group     A fib, CAD- on xarelto, toprol XL 12.5 mg daily  Shabnam Carranza      Stroke in August 2023- followed up with neurology [facility] who added crestor and aspirin- Cheryl Quiles CNP ; also saw Magalys Gorman PA for incidental bilateral cavernous ICA aneurysms - advised MRA brain wo contrast in 1 year to follow (due sept 2024)      Barretts- on PPI  EGD 2022 - Dr. Rojas     Osteopenia- 5/14/19 dexa - repeat?     She had LE cellulitis in Feb   Lasix added for edema     Follows with urology for urinary retention, UTIs     MDS- Dr. Macdonald         Current Outpatient Medications   Medication Sig Dispense Refill    ascorbic acid (Vitamin C) 500 mg chewable tablet Chew 1 tablet (500 mg) once daily.      calcitriol (Rocaltrol) 0.25 mcg capsule Take 1 capsule 3 times weekly (Patient not taking: Reported on 2/16/2024) 45 capsule 3    calcium carbonate-vitamin D3 600 mg-10 mcg (400 unit) tablet Take by mouth.      cyanocobalamin (Vitamin B-12) 1,000 mcg tablet Take 100 mcg by mouth once daily.      ferrous sulfate 325 (65 Fe) MG EC tablet Take 65 mg by mouth 2 times a day. Do not crush, chew, or split.      furosemide (Lasix) 20 mg tablet Take 1 tablet (20 mg) by mouth once daily.      ipratropium-albuteroL (Duo-Neb) 0.5-2.5 mg/3 mL nebulizer solution USE 3 ML VIA NEBULIZER EVERY 4 HOURS AS NEEDED FO SHORTNESS OF BREATH      magnesium 250 mg tablet Take 1 tablet (250 mg) by mouth.      metoprolol succinate XL (Toprol-XL) 25 mg 24 hr tablet Take 0.5 tablets (12.5 mg) by mouth once daily.      mirtazapine (Remeron) 7.5 mg tablet TAKE 1 TABLET BY MOUTH EVERY NIGHT AT BEDTIME FOR POOR APPETITE      multivitamin tablet once every 24 hours.      omeprazole (PriLOSEC) 20 mg DR capsule TAKE 1 CAPSULE BY MOUTH ONCE  DAILY 90 capsule 3    rivaroxaban (Xarelto) 15 mg tablet once  every 24 hours.      rosuvastatin (Crestor) 10 mg tablet Take 1 tablet (10 mg) by mouth once daily. 90 tablet 3     No current facility-administered medications for this visit.       Review of Systems      Scales reviewed            Objective   There were no vitals taken for this visit.    Physical Exam    Assessment/Plan   {Assess/PlanSmartLinks:25524}    Makenna Zafar,   4/11/2024

## 2024-04-12 ENCOUNTER — PATIENT OUTREACH (OUTPATIENT)
Dept: PRIMARY CARE | Facility: CLINIC | Age: 84
End: 2024-04-12
Payer: MEDICARE

## 2024-04-15 ENCOUNTER — PATIENT OUTREACH (OUTPATIENT)
Dept: PRIMARY CARE | Facility: CLINIC | Age: 84
End: 2024-04-15
Payer: MEDICARE

## 2024-04-15 DIAGNOSIS — R60.0 BILATERAL EDEMA OF LOWER EXTREMITY: ICD-10-CM

## 2024-04-15 DIAGNOSIS — E78.5 HYPERLIPIDEMIA, UNSPECIFIED HYPERLIPIDEMIA TYPE: ICD-10-CM

## 2024-04-15 DIAGNOSIS — I63.9 ACUTE ISCHEMIC STROKE (MULTI): ICD-10-CM

## 2024-04-15 DIAGNOSIS — I12.9 HYPERTENSION WITH RENAL DISEASE: ICD-10-CM

## 2024-04-15 PROCEDURE — 99490 CHRNC CARE MGMT STAFF 1ST 20: CPT | Performed by: FAMILY MEDICINE

## 2024-04-15 RX ORDER — ROSUVASTATIN CALCIUM 10 MG/1
10 TABLET, COATED ORAL DAILY
Qty: 90 TABLET | Refills: 1 | Status: SHIPPED | OUTPATIENT
Start: 2024-04-15 | End: 2025-04-15

## 2024-04-15 NOTE — PROGRESS NOTES
LATE NOTE  Giselle called back. She stated she is doing well. She saw surgeon and she is doing well s/p ORIF. She continues to exercise     A-FIB  On xarelto no issues with racing heart or obtained medication    HTM  Blood pressure remains within normal limits. Pleased with control.    GERD  No issues at this time    EDEMA  No issues. Skin intact    Eating healthy. Usually 2 meals a day.   No questions or concerns. Encouraged her to reach out with any concerns or issues.

## 2024-04-23 ENCOUNTER — OFFICE VISIT (OUTPATIENT)
Dept: HEMATOLOGY/ONCOLOGY | Facility: CLINIC | Age: 84
End: 2024-04-23
Payer: MEDICARE

## 2024-04-23 ENCOUNTER — APPOINTMENT (OUTPATIENT)
Dept: HEMATOLOGY/ONCOLOGY | Facility: CLINIC | Age: 84
End: 2024-04-23
Payer: MEDICARE

## 2024-04-23 ENCOUNTER — LAB (OUTPATIENT)
Dept: LAB | Facility: CLINIC | Age: 84
End: 2024-04-23
Payer: MEDICARE

## 2024-04-23 VITALS
WEIGHT: 148.37 LBS | OXYGEN SATURATION: 97 % | BODY MASS INDEX: 26.28 KG/M2 | DIASTOLIC BLOOD PRESSURE: 86 MMHG | TEMPERATURE: 98.2 F | SYSTOLIC BLOOD PRESSURE: 141 MMHG | RESPIRATION RATE: 16 BRPM | HEART RATE: 87 BPM

## 2024-04-23 DIAGNOSIS — D64.9 CHRONIC ANEMIA: ICD-10-CM

## 2024-04-23 DIAGNOSIS — D46.9 MYELODYSPLASTIC SYNDROME (MULTI): ICD-10-CM

## 2024-04-23 DIAGNOSIS — D64.9 CHRONIC ANEMIA: Primary | ICD-10-CM

## 2024-04-23 LAB
ERYTHROCYTE [DISTWIDTH] IN BLOOD BY AUTOMATED COUNT: 18 % (ref 11.5–14.5)
HCT VFR BLD AUTO: 36.6 % (ref 36–46)
HGB BLD-MCNC: 11 G/DL (ref 12–16)
MCH RBC QN AUTO: 30.1 PG (ref 26–34)
MCHC RBC AUTO-ENTMCNC: 30.1 G/DL (ref 32–36)
MCV RBC AUTO: 100 FL (ref 80–100)
NRBC BLD-RTO: ABNORMAL /100{WBCS}
PLATELET # BLD AUTO: 192 X10*3/UL (ref 150–450)
RBC # BLD AUTO: 3.66 X10*6/UL (ref 4–5.2)
WBC # BLD AUTO: 6.6 X10*3/UL (ref 4.4–11.3)

## 2024-04-23 PROCEDURE — 1159F MED LIST DOCD IN RCRD: CPT | Performed by: INTERNAL MEDICINE

## 2024-04-23 PROCEDURE — 83540 ASSAY OF IRON: CPT

## 2024-04-23 PROCEDURE — 1160F RVW MEDS BY RX/DR IN RCRD: CPT | Performed by: INTERNAL MEDICINE

## 2024-04-23 PROCEDURE — 85027 COMPLETE CBC AUTOMATED: CPT

## 2024-04-23 PROCEDURE — 82728 ASSAY OF FERRITIN: CPT

## 2024-04-23 PROCEDURE — 36415 COLL VENOUS BLD VENIPUNCTURE: CPT

## 2024-04-23 PROCEDURE — 99214 OFFICE O/P EST MOD 30 MIN: CPT | Performed by: INTERNAL MEDICINE

## 2024-04-23 PROCEDURE — 1126F AMNT PAIN NOTED NONE PRSNT: CPT | Performed by: INTERNAL MEDICINE

## 2024-04-23 PROCEDURE — 3079F DIAST BP 80-89 MM HG: CPT | Performed by: INTERNAL MEDICINE

## 2024-04-23 PROCEDURE — 3077F SYST BP >= 140 MM HG: CPT | Performed by: INTERNAL MEDICINE

## 2024-04-23 ASSESSMENT — PAIN SCALES - GENERAL: PAINLEVEL: 0-NO PAIN

## 2024-04-23 NOTE — PROGRESS NOTES
Patient ID: Giselle Escobar is a 83 y.o. female.  Referring Physician: No referring provider defined for this encounter.  Primary Care Provider: Makenna Zafar DO    ORDERS & PATIENT INSTRUCTIONS:      RTC 6-month  CBC, iron group, ferritin, B12, S copper          ASSESSMENT, PROBLEM LIST, DECISION MAKING, PLAN.    1. Myelodysplastic syndrome/refractory anemia diagnosed in April 2010. bone marrow biopsy was otherwise negativeIn April 2010  Patient was initially treated with Procrit but after several months hemoglobin stabilized so Procrit was discontinued and has been on watchful waiting   2. Hyperhomocystinemia.   3. Gastric bypass and history of low estevan level.  On replacement   4. Hypertension.   5.  CVA in August 2023        INTERVAL HISTORY: The patient is here today for followup on MDS.  Patient had CVA in August 2023 and was admitted at [facility] and had left hemiparesis and dysarthria, later she fell and broke her femur in September 2023 requiring internal fixation at [facility].  Later she also had a COVID and he also had rehab at nursing home although for last couple of months she is back home she is slowly improving.  Although she is not driving and using wheelchair.  Although has slowly started walking again.        PHYSICAL EXAMINATION:    General: Conscious, alert, oriented x3, not in acute distress.  HEENT:    No icterus.    Chest:Bilateral symmetrical,     CVS:  S1, S2.    Abdomen:  Soft, no palpable mass   Extremities: No clubbing, cyanosis,     Skin: No petechial rash.      ASSESSMENT AND PLAN: Ms. Escobar with refractory anemia/myelodysplastic syndrome whose hemoglobin is reasonably stable. Continue watchful waiting. No further workup. Patient has not received Procrit for several years and her hemoglobin stabilized so it was discontinued     Patient had multiple issues with stroke on August 31, 2023, broken femur in September 2023, COVID, rehab placement but now she is back  home and slowly improving her hemoglobin is stable at 11 g/dL  No further workup at this time we will continue watchful waiting      She is on Xarelto because of stroke and currently on watchful waiting      Patient has a history of gastric bypass-  periodic monitoring of iron studies and serum B12 folate and copper level,   History of copper deficiency, continue multivitamin with copper, continue sublingual B12 tablet  We are awaiting iron studies B12 folate and copper level, patient was advised to continue sublingual B12 tablet        VITALS:   1.73 meters squared /86   Pulse 87   Temp 36.8 °C (98.2 °F) (Temporal)   Resp 16   Wt 67.3 kg (148 lb 5.9 oz)   SpO2 97%   BMI 26.28 kg/m²     LABS:    CBC:  Recent Labs     04/23/24  1007 02/02/24  1227 01/05/24  1341 10/31/23  1725 09/19/23  0939   WBC 6.6 5.6 5.8 7.2 5.0   HGB 11.0* 10.8* 10.9* 7.9* 10.4*   HCT 36.6 35.1* 38.4 28.5* 33.9*    248 255 303 245    94 98 110* 99       CMP:  Recent Labs     02/02/24  1227 01/05/24  1341 08/21/23  1141 05/16/23  1409 04/26/23  1048 10/04/22  1016 08/03/22  1134 05/06/22  1038 09/07/21  1023 05/05/21  0917 10/30/20  0735 06/04/20  0719    141 139 141 140   < > 142   < > 141 141 144 144   K 4.0 3.9 5.1 4.1 4.5   < > 4.9   < > 4.6 3.9 4.2 4.2   * 105 111* 107 107   < > 113*   < > 112* 110* 111* 110*   CO2 24 28 17* 25 23   < > 17*   < > 19* 21 26 27   ANIONGAP 14 12 16 13 15   < > 17   < > 15 14 11 11   BUN 26* 23 32* 28* 46*   < > 29*   < > 37* 30* 29* 31*   CREATININE 1.37* 1.36* 1.84* 1.53* 2.20*   < > 1.66*   < > 1.77* 1.80* 1.70* 1.49*   EGFR 38* 38*  --   --   --   --   --   --   --   --   --   --    MG  --   --   --   --   --   --  2.24  --  2.15 2.26 2.08 2.21    < > = values in this interval not displayed.     Recent Labs     01/05/24  1341 08/21/23  1141 05/16/23  1409 10/04/22  1016 08/03/22  1134 05/06/22  1038 05/28/19  0835 04/08/19  0756   ALBUMIN 3.5 4.0 3.8 4.1 3.8 4.2   < >  "3.9   ALKPHOS 116  --  86 92  --  79  --  57   ALT 17  --  21 31  --  19  --  14   AST 24  --  17 22  --  18  --  20   BILITOT 0.4  --  0.4 0.4  --  0.4  --  0.4    < > = values in this interval not displayed.       HEME/ENDO:  Recent Labs     09/19/23  0939 05/16/23  1409 04/26/23  1048 10/04/22  1016 06/03/22  0956 05/06/22  1038 02/23/21  0000 06/23/20  1000 08/17/18  0904 05/22/18  0954   FERRITIN 37 73 117 45   < > 55   < > 36   < >  --    IRONSAT 15* 14* 18* 25   < > 20*   < > 26   < >  --    TSH  --   --   --   --   --  1.58  --   --   --  1.28   [id number] 1,285* 1,030* >2000* 1,096*   < >  --    < > 1,395*   < >  --    FOLATE  --   --  >24.0  --   --   --   --  >24.0  --   --     < > = values in this interval not displayed.        MICRO:   Recent Labs     02/02/24  1227   CRP 0.87     No results found for the last 90 days.        TUMOR MARKERS:  No results found for: \"LABCA2\", \"CEA\", \"\", \"PSA\", \"AFPTM\", \"HCGTM\", \"\"             IMAGING:         Lower extremity venous duplex left                   [address], [address], [facility] [address]                   Tel [phone]       Vascular Lab Report  [facility] US LOWER EXTREMITY VENOUS DUPLEX LEFT       Patient Name:      THONY Forbes Physician: [id number] Kendra Garcia MD  Study Date:        2/2/2024           Ordering           [id number] JAYE FARAH                                        Physician:  MRN/PID:           [id number]           Technologist:      Tanja Harding RVT,                                                           [facility]  Accession#:        [id number]       Technologist 2:  Date of Birth/Age: 1940 / 83      Encounter#:        [id number]                     years  Gender:            F  Admission Status:  Outpatient         Location           [facility]                                        Performed:       Diagnosis/ICD: Localized (leg) edema-R60.0  CPT Codes:     20507 Peripheral " venous duplex scan for DVT Limited       Patient History Hx of left femur fx, left knee swollen.       CONCLUSIONS:  Right Lower Venous: Right common femoral vein is negative for deep vein thrombus.  Left Lower Venous: No evidence of acute deep vein thrombus visualized in the left lower extremity. Calf veins poorly visualized due to edema.     Imaging & Doppler Findings:     Right Compressible Thrombus        Flow  CFV       Yes        None   Spontaneous/Phasic       Left                  Compress Thrombus        Flow  Distal External Iliac            None   Spontaneous/Phasic  CFV                     Yes      None   Spontaneous/Phasic  PFV                     Yes      None  FV Proximal             Yes      None   Spontaneous/Phasic  FV Mid                  Yes      None  FV Distal               Yes      None  Popliteal               Yes      None   Spontaneous/Phasic       [id number] Kendra Garcia MD  Electronically signed by [id number] Kendra Garcia MD on 2/2/2024 at 5:27:21 PM       ** Final **  XR knee right 1-2 views  Narrative: STUDY:  Knee Radiographs; 2/2/24 at 1:24 PM  INDICATION:  Right medial knee pain for 2 days.  COMPARISON:  None available.  ACCESSION NUMBER(S):  [id number]  ORDERING CLINICIAN:  JAYE FARAH  TECHNIQUE:  Two view(s) of the right knee.  FINDINGS:   The total knee arthroplasty is in anatomic alignment.  There is a  transverse fracture of the distal femur with an intramedullary wing in  place.  There is a medial displacement of the distal fragments.   Developing callus and periosteal reaction are noted however the  fracture plane is still visualized.  Clinical correlation as to age of  injury is needed.  There is an irregular appearance of the distal  femoral shaft anteriorly which could be due to comminution of the  fracture and partial healing, infection is difficult to fully exclude.   There is an oval calcification posterior to the knee around 2.2 x 1.0  cm in diameter.  The possibility of  loose body in a Baker's cyst is  considered.  Impression: Fracture of the distal right femur with intramedullary wing in place.  Clinical correlation as to age of injury is needed.  Healing appears  to be incomplete.  Arthroplasty is also noted.  Irregularity of the  adjacent  anterior femur could be due to comminution of the fracture  and partial healing although infection is difficult to fully exclude.   There is an oval calcification posterior to the knee which could  represent a loose body in a Baker's cyst.  Signed by Riri Holt DO       Current Outpatient Medications   Medication Sig Dispense Refill    ascorbic acid (Vitamin C) 500 mg chewable tablet Chew 1 tablet (500 mg) once daily.      calcitriol (Rocaltrol) 0.25 mcg capsule Take 1 capsule 3 times weekly 45 capsule 3    calcium carbonate-vitamin D3 600 mg-10 mcg (400 unit) tablet Take by mouth.      cyanocobalamin (Vitamin B-12) 1,000 mcg tablet Take 100 mcg by mouth once daily.      ferrous sulfate 325 (65 Fe) MG EC tablet Take 65 mg by mouth 2 times a day. Do not crush, chew, or split.      furosemide (Lasix) 20 mg tablet Take 1 tablet (20 mg) by mouth once daily.      ipratropium-albuteroL (Duo-Neb) 0.5-2.5 mg/3 mL nebulizer solution USE 3 ML VIA NEBULIZER EVERY 4 HOURS AS NEEDED FO SHORTNESS OF BREATH      magnesium 250 mg tablet Take 1 tablet (250 mg) by mouth.      metoprolol succinate XL (Toprol-XL) 25 mg 24 hr tablet Take 0.5 tablets (12.5 mg) by mouth once daily.      mirtazapine (Remeron) 7.5 mg tablet TAKE 1 TABLET BY MOUTH EVERY NIGHT AT BEDTIME FOR POOR APPETITE      multivitamin tablet once every 24 hours.      omeprazole (PriLOSEC) 20 mg DR capsule TAKE 1 CAPSULE BY MOUTH ONCE  DAILY 90 capsule 3    rivaroxaban (Xarelto) 15 mg tablet once every 24 hours.      rosuvastatin (Crestor) 10 mg tablet Take 1 tablet (10 mg) by mouth once daily. 90 tablet 1     No current facility-administered medications for this visit.                  SOCIAL  HISTORY:    reports that she does not currently use alcohol.   reports no history of drug use.,   Tobacco Use: Medium Risk (2/16/2024)    Patient History     Smoking Tobacco Use: Former     Smokeless Tobacco Use: Never     Passive Exposure: Never       FAMILY HISTORY:  Family History   Problem Relation Name Age of Onset    Diabetes Mother Dd     Heart attack Mother Dd     Heart attack Father      ALS Son         ALLERGY:   Dye, Lisinopril, Morphine, and Iodinated contrast media              Medication reviewed in e-chart  Patient is monitored for medication toxicity  labs reviewed and interpreted independently, X rays independently reviewed  Notes from other physicians involved in care were reviewed    Charting was completed using voice recognition technology and may include unintended errors.    TAY DOMÍNGUEZ MD, VINNIE.    Bri Novak Master Clinician in Hematology and Oncology  Medical Director, [facility] at [facility].  [place] office  Phone [phone]  Fax      [phone]  [facility] /[place].  Phone [phone]  Fax     [phone]

## 2024-04-23 NOTE — PROGRESS NOTES
Follow-up in 6 months with labs prior outisde.  Call back instructions reviewed.  Patient verbalized understanding.

## 2024-04-24 ENCOUNTER — HOSPITAL ENCOUNTER (OUTPATIENT)
Dept: VASCULAR MEDICINE | Facility: CLINIC | Age: 84
Discharge: HOME | End: 2024-04-24
Payer: MEDICARE

## 2024-04-24 ENCOUNTER — HOSPITAL ENCOUNTER (OUTPATIENT)
Dept: CARDIOLOGY | Facility: CLINIC | Age: 84
Discharge: HOME | End: 2024-04-24
Payer: MEDICARE

## 2024-04-24 DIAGNOSIS — Z86.73 H/O TIA (TRANSIENT ISCHEMIC ATTACK) AND STROKE: ICD-10-CM

## 2024-04-24 DIAGNOSIS — G45.9 TIA (TRANSIENT ISCHEMIC ATTACK): ICD-10-CM

## 2024-04-24 DIAGNOSIS — I25.119 CORONARY ARTERY DISEASE WITH ANGINA PECTORIS, UNSPECIFIED VESSEL OR LESION TYPE, UNSPECIFIED WHETHER NATIVE OR TRANSPLANTED HEART (CMS-HCC): ICD-10-CM

## 2024-04-24 DIAGNOSIS — I35.0 AORTIC STENOSIS, MILD: ICD-10-CM

## 2024-04-24 DIAGNOSIS — R09.89 OTHER SPECIFIED SYMPTOMS AND SIGNS INVOLVING THE CIRCULATORY AND RESPIRATORY SYSTEMS: ICD-10-CM

## 2024-04-24 LAB
FERRITIN SERPL-MCNC: 138 NG/ML (ref 8–150)
IRON SATN MFR SERPL: 25 % (ref 25–45)
IRON SERPL-MCNC: 63 UG/DL (ref 35–150)
TIBC SERPL-MCNC: 255 UG/DL (ref 240–445)
UIBC SERPL-MCNC: 192 UG/DL (ref 110–370)

## 2024-04-24 PROCEDURE — 93306 TTE W/DOPPLER COMPLETE: CPT

## 2024-04-24 PROCEDURE — 93880 EXTRACRANIAL BILAT STUDY: CPT

## 2024-04-24 PROCEDURE — 93880 EXTRACRANIAL BILAT STUDY: CPT | Performed by: SURGERY

## 2024-04-24 PROCEDURE — 93306 TTE W/DOPPLER COMPLETE: CPT | Performed by: STUDENT IN AN ORGANIZED HEALTH CARE EDUCATION/TRAINING PROGRAM

## 2024-04-25 ENCOUNTER — OFFICE VISIT (OUTPATIENT)
Dept: CARDIOLOGY | Facility: CLINIC | Age: 84
End: 2024-04-25
Payer: MEDICARE

## 2024-04-25 VITALS
HEIGHT: 63 IN | BODY MASS INDEX: 26.22 KG/M2 | WEIGHT: 148 LBS | HEART RATE: 77 BPM | DIASTOLIC BLOOD PRESSURE: 77 MMHG | OXYGEN SATURATION: 97 % | SYSTOLIC BLOOD PRESSURE: 120 MMHG

## 2024-04-25 DIAGNOSIS — I48.0 PAROXYSMAL ATRIAL FIBRILLATION (MULTI): ICD-10-CM

## 2024-04-25 DIAGNOSIS — H90.3 SENSORINEURAL HEARING LOSS (SNHL) OF BOTH EARS: Primary | ICD-10-CM

## 2024-04-25 DIAGNOSIS — I12.9 HYPERTENSION WITH RENAL DISEASE: ICD-10-CM

## 2024-04-25 DIAGNOSIS — I25.10 CORONARY ARTERY DISEASE INVOLVING NATIVE CORONARY ARTERY OF NATIVE HEART WITHOUT ANGINA PECTORIS: ICD-10-CM

## 2024-04-25 LAB
AORTIC VALVE MEAN GRADIENT: 6 MMHG
AORTIC VALVE PEAK VELOCITY: 1.61 M/S
AV PEAK GRADIENT: 10.4 MMHG
AVA (PEAK VEL): 1.98 CM2
AVA (VTI): 2 CM2
EJECTION FRACTION APICAL 4 CHAMBER: 81.8
LEFT ATRIUM VOLUME AREA LENGTH INDEX BSA: 60.2 ML/M2
LEFT VENTRICLE INTERNAL DIMENSION DIASTOLE: 3.3 CM (ref 3.5–6)
LEFT VENTRICULAR OUTFLOW TRACT DIAMETER: 2.01 CM
LV EJECTION FRACTION BIPLANE: 77 %
MITRAL VALVE E/E' RATIO: 20.62
RIGHT VENTRICLE FREE WALL PEAK S': 10.28 CM/S
RIGHT VENTRICLE PEAK SYSTOLIC PRESSURE: 34.2 MMHG
TRICUSPID ANNULAR PLANE SYSTOLIC EXCURSION: 1.5 CM

## 2024-04-25 PROCEDURE — 1160F RVW MEDS BY RX/DR IN RCRD: CPT | Performed by: NURSE PRACTITIONER

## 2024-04-25 PROCEDURE — 1159F MED LIST DOCD IN RCRD: CPT | Performed by: NURSE PRACTITIONER

## 2024-04-25 PROCEDURE — 99214 OFFICE O/P EST MOD 30 MIN: CPT | Performed by: NURSE PRACTITIONER

## 2024-04-25 PROCEDURE — 3078F DIAST BP <80 MM HG: CPT | Performed by: NURSE PRACTITIONER

## 2024-04-25 PROCEDURE — 3074F SYST BP LT 130 MM HG: CPT | Performed by: NURSE PRACTITIONER

## 2024-04-25 PROCEDURE — 1036F TOBACCO NON-USER: CPT | Performed by: NURSE PRACTITIONER

## 2024-04-25 NOTE — PROGRESS NOTES
Giselle Escobar is a 83 y.o. female     History Of Present Illness   Mrs Escobar is an 83 year old female with a PMH of CHRONIC A-fib (on Eliquis), aortic stenosis, Coronary artery disease, TIA/CVA.hypertension, chronic anemia, osteopenia and Stage III CKD, here for a routine follow up. She was recently hospitalized at [facility] for cellulitis with iv antbx after sustaining a leg injury.  She is complaining of left sided weakness and lower extremity edema.  She admits that she sits most of the day.  She also admits she is not taking her lasix due to concern of frequent urination.  I have reviewed the results of her most recent carotid duplex scan.  She did have an echo completed yesterday, however I do not have the results at the time of this visit.      Social HX  Social History     Tobacco Use    Smoking status: Former     Current packs/day: 0.00     Average packs/day: 0.3 packs/day for 15.0 years (3.8 ttl pk-yrs)     Types: Cigarettes     Start date: 1975     Quit date: 1990     Years since quittin.3     Passive exposure: Never    Smokeless tobacco: Never   Substance Use Topics    Alcohol use: Not Currently    Drug use: Never          Family HX  Family History   Problem Relation Name Age of Onset    Diabetes Mother Dd     Heart attack Mother Dd     Heart attack Father      ALS Son            Review Of Systems   Constitutional: + feeling tired.   Eyes: no eyesight problems.  No vision loss or change in vision  ENT: + hearing loss and no nosebleeds.   Cardiovascular: No intermittent leg claudication,   No chest pain, no tightness or heavy pressure  No shortness of breath,  No palpitations,  +bilateral  lower extremity edema  The heart rate is regular  Respiratory: no chronic cough and no shortness of breath.   Gastrointestinal: no change in bowel habits and no blood in stools.   Genitourinary: no urinary frequency.   Skin: no skin rashes.   Neurological: No frequent falls.   No dizziness  + left  sided  weakness  Denies headaches  Psychiatric: no depression and not suicidal.   All other systems have been reviewed and are negative for complaint.        Allergies  Allergies   Allergen Reactions    Dye Hives     IV dye    Lisinopril Unknown    Morphine Other, Unknown and Hallucinations     dystonia    Iodinated Contrast Media Itching and Rash     per pt to [facility] ct tech, md aware and spoke to radiolgist before scan done today          Vitals  There were no vitals taken for this visit.        Physical Exam  Constitutional: alert and in no acute distress.   Eyes: no erythema, swelling or discharge from the eye .   Neck: neck is supple, symmetric, trachea midline, no masses  and no thyromegaly .   Pulmonary: No increased work of breathing or signs of respiratory distress    Lungs clear to auscultation.    No friction rub.  Cardiovascular: carotid pulses 2+ bilaterally with no bruit    JVP was normal, no thrills ,   Irregular rhythm, normal S1 and S2, no murmurs    Pedal pulses 2+ bilaterally    +2 pitting  edema to lower extremities.   Abdomen: abdomen non-tender, no masses  and no hepatomegaly . No pulsatile mass noted  Skin: skin warm and dry, normal skin turgor .   Psychiatric judgment and insight is normal  and oriented to person, place and time .           Current/Home Meds    Current Outpatient Medications:     ascorbic acid (Vitamin C) 500 mg chewable tablet, Chew 1 tablet (500 mg) once daily., Disp: , Rfl:     calcitriol (Rocaltrol) 0.25 mcg capsule, Take 1 capsule 3 times weekly, Disp: 45 capsule, Rfl: 3    calcium carbonate-vitamin D3 600 mg-10 mcg (400 unit) tablet, Take by mouth., Disp: , Rfl:     cyanocobalamin (Vitamin B-12) 1,000 mcg tablet, Take 100 mcg by mouth once daily., Disp: , Rfl:     ferrous sulfate 325 (65 Fe) MG EC tablet, Take 65 mg by mouth 2 times a day. Do not crush, chew, or split., Disp: , Rfl:     furosemide (Lasix) 20 mg tablet, Take 1 tablet (20 mg) by mouth once daily., Disp: ,  Rfl:     ipratropium-albuteroL (Duo-Neb) 0.5-2.5 mg/3 mL nebulizer solution, USE 3 ML VIA NEBULIZER EVERY 4 HOURS AS NEEDED FO SHORTNESS OF BREATH, Disp: , Rfl:     magnesium 250 mg tablet, Take 1 tablet (250 mg) by mouth., Disp: , Rfl:     metoprolol succinate XL (Toprol-XL) 25 mg 24 hr tablet, Take 0.5 tablets (12.5 mg) by mouth once daily., Disp: , Rfl:     mirtazapine (Remeron) 7.5 mg tablet, TAKE 1 TABLET BY MOUTH EVERY NIGHT AT BEDTIME FOR POOR APPETITE, Disp: , Rfl:     multivitamin tablet, once every 24 hours., Disp: , Rfl:     omeprazole (PriLOSEC) 20 mg DR capsule, TAKE 1 CAPSULE BY MOUTH ONCE  DAILY, Disp: 90 capsule, Rfl: 3    rivaroxaban (Xarelto) 15 mg tablet, once every 24 hours., Disp: , Rfl:     rosuvastatin (Crestor) 10 mg tablet, Take 1 tablet (10 mg) by mouth once daily., Disp: 90 tablet, Rfl: 1       Labs         Cardiac Service Results:  CAROTID DUPLEX SCAN 4/24/24:  Right                       Left    PSV      EDV                PSV     EDV  54 cm/s            CCA P    46 cm/s  37 cm/s            CCA D    48 cm/s  70 cm/s  25 cm/s   ICA P    49 cm/s 20 cm/s  125 cm/s 43 cm/s   ICA M    81 cm/s 34 cm/s  61 cm/s  28 cm/s   ICA D    77 cm/s 27 cm/s  61 cm/s             ECA     36 cm/s  35 cm/s  11 cm/s Vertebral  37 cm/s 9 cm/s  139 cm/s         Subclavian 53 cm/s                   Right Left  ICA/CCA Ratio  1.9  1.0  Assessment/Plan    CAROTID STENOSIS: Carotid duplex scan shows evidence of right sided stenosis.  She does have a history of a CVA with left sided weakness.  The patient is to continue statin and xarelto as ordered.  She is complaining of hearing loss since her stroke and would like a referral to ENT for further evaluation.    Hypertension:  BP well managed.  BP today 120/77.  Continue losartan    LOWER EXTREMITY EDEMA:  Echo was completed yesterday, however I do not have the results of this at the time of the visit.  I will call her with results.  She admits she is not taking her  lasix die to frequent urination.  She does have 2-3+ pitting edema to her legs with a recent hospitalization of cellulitis from ruptured blisters to her lower legs.  I have provided her with mild compression.  She is to restart lasix, elevate her legs and eliminate sodium from her diet.    Follow up in 6 months or sooner for any questions or concerns.

## 2024-05-15 ENCOUNTER — PATIENT OUTREACH (OUTPATIENT)
Dept: PRIMARY CARE | Facility: CLINIC | Age: 84
End: 2024-05-15
Payer: MEDICARE

## 2024-05-17 ENCOUNTER — PATIENT OUTREACH (OUTPATIENT)
Dept: PRIMARY CARE | Facility: CLINIC | Age: 84
End: 2024-05-17
Payer: MEDICARE

## 2024-05-20 ENCOUNTER — LAB (OUTPATIENT)
Dept: LAB | Facility: LAB | Age: 84
End: 2024-05-20
Payer: MEDICARE

## 2024-05-20 ENCOUNTER — PATIENT OUTREACH (OUTPATIENT)
Dept: PRIMARY CARE | Facility: CLINIC | Age: 84
End: 2024-05-20
Payer: MEDICARE

## 2024-05-20 DIAGNOSIS — Z87.81 S/P ORIF (OPEN REDUCTION INTERNAL FIXATION) FRACTURE: ICD-10-CM

## 2024-05-20 DIAGNOSIS — Z98.890 S/P ORIF (OPEN REDUCTION INTERNAL FIXATION) FRACTURE: ICD-10-CM

## 2024-05-20 DIAGNOSIS — I12.9 HYPERTENSION WITH RENAL DISEASE: ICD-10-CM

## 2024-05-20 DIAGNOSIS — N25.81 SECONDARY HYPERPARATHYROIDISM OF RENAL ORIGIN (MULTI): ICD-10-CM

## 2024-05-20 DIAGNOSIS — R60.0 BILATERAL EDEMA OF LOWER EXTREMITY: ICD-10-CM

## 2024-05-20 DIAGNOSIS — N18.4 CHRONIC KIDNEY DISEASE, STAGE 4 (SEVERE) (MULTI): Primary | ICD-10-CM

## 2024-05-20 DIAGNOSIS — D64.9 ANEMIA, UNSPECIFIED: ICD-10-CM

## 2024-05-20 DIAGNOSIS — E78.5 HYPERLIPIDEMIA, UNSPECIFIED HYPERLIPIDEMIA TYPE: ICD-10-CM

## 2024-05-20 PROCEDURE — 85027 COMPLETE CBC AUTOMATED: CPT

## 2024-05-20 PROCEDURE — 80069 RENAL FUNCTION PANEL: CPT

## 2024-05-20 PROCEDURE — 36415 COLL VENOUS BLD VENIPUNCTURE: CPT

## 2024-05-20 PROCEDURE — 83970 ASSAY OF PARATHORMONE: CPT

## 2024-05-20 PROCEDURE — 82570 ASSAY OF URINE CREATININE: CPT

## 2024-05-20 PROCEDURE — 82306 VITAMIN D 25 HYDROXY: CPT

## 2024-05-20 PROCEDURE — 99490 CHRNC CARE MGMT STAFF 1ST 20: CPT | Performed by: FAMILY MEDICINE

## 2024-05-20 PROCEDURE — 84156 ASSAY OF PROTEIN URINE: CPT

## 2024-05-20 NOTE — PROGRESS NOTES
Giselle's  called back and put Giselle on the phone.  Her leg has been doing well. She continues to use her walker with ambulation  She is not having any GERD/reflux. She states she can only eat a limited amount at one time or she can get stomach upset.  Discussed eating protein snacks if she did not get enough nutrition.    A-fib  No racing heart ot chest pain. Having no issues obtaining any of her medications or taking them.    HTN  Blood pressure remain good no issues discussed foods with more sodium in them that are often eaten in the summer..      Edema of lower extremites very limited. Skin intact      Will reach out if she has any needs or concerns.

## 2024-05-21 LAB
25(OH)D3 SERPL-MCNC: 53 NG/ML (ref 30–100)
ALBUMIN SERPL BCP-MCNC: 3.1 G/DL (ref 3.4–5)
ANION GAP SERPL CALC-SCNC: 15 MMOL/L (ref 10–20)
BUN SERPL-MCNC: 29 MG/DL (ref 6–23)
CALCIUM SERPL-MCNC: 8.3 MG/DL (ref 8.6–10.6)
CHLORIDE SERPL-SCNC: 112 MMOL/L (ref 98–107)
CO2 SERPL-SCNC: 20 MMOL/L (ref 21–32)
CREAT SERPL-MCNC: 1.61 MG/DL (ref 0.5–1.05)
CREAT UR-MCNC: 57.2 MG/DL (ref 20–320)
EGFRCR SERPLBLD CKD-EPI 2021: 31 ML/MIN/1.73M*2
ERYTHROCYTE [DISTWIDTH] IN BLOOD BY AUTOMATED COUNT: 16 % (ref 11.5–14.5)
GLUCOSE SERPL-MCNC: 108 MG/DL (ref 74–99)
HCT VFR BLD AUTO: 31.4 % (ref 36–46)
HGB BLD-MCNC: 9.6 G/DL (ref 12–16)
MCH RBC QN AUTO: 30.4 PG (ref 26–34)
MCHC RBC AUTO-ENTMCNC: 30.6 G/DL (ref 32–36)
MCV RBC AUTO: 99 FL (ref 80–100)
NRBC BLD-RTO: 0 /100 WBCS (ref 0–0)
PHOSPHATE SERPL-MCNC: 4.1 MG/DL (ref 2.5–4.9)
PLATELET # BLD AUTO: 212 X10*3/UL (ref 150–450)
POTASSIUM SERPL-SCNC: 4.5 MMOL/L (ref 3.5–5.3)
PROT UR-ACNC: 54 MG/DL (ref 5–24)
PROT/CREAT UR: 0.94 MG/MG CREAT (ref 0–0.17)
PTH-INTACT SERPL-MCNC: 146.9 PG/ML (ref 18.5–88)
RBC # BLD AUTO: 3.16 X10*6/UL (ref 4–5.2)
SODIUM SERPL-SCNC: 142 MMOL/L (ref 136–145)
WBC # BLD AUTO: 5.4 X10*3/UL (ref 4.4–11.3)

## 2024-06-20 ENCOUNTER — PATIENT OUTREACH (OUTPATIENT)
Dept: PRIMARY CARE | Facility: CLINIC | Age: 84
End: 2024-06-20
Payer: MEDICARE

## 2024-06-20 DIAGNOSIS — I12.9 HYPERTENSION WITH RENAL DISEASE: ICD-10-CM

## 2024-06-20 DIAGNOSIS — E78.5 HYPERLIPIDEMIA, UNSPECIFIED HYPERLIPIDEMIA TYPE: ICD-10-CM

## 2024-06-20 DIAGNOSIS — R60.0 BILATERAL EDEMA OF LOWER EXTREMITY: ICD-10-CM

## 2024-06-20 DIAGNOSIS — I48.0 PAROXYSMAL ATRIAL FIBRILLATION (MULTI): ICD-10-CM

## 2024-06-20 NOTE — PROGRESS NOTES
Called CM back and CM returned call.  Giselle is doing well. She continues to do her exercises. She does not have to use her walker,  She is not having leg edema presently but she has to urinate during the night. She is careful that she does not fall.  She is eating well but limits her portions so she does not feel ill post meal time. Discussed different snacks she could eat.  She is not having any racing heart or chest pain  Her blood pressure has been with normal range 110/60  She is able to take and get her medications without issues.    Encouraged her and her spouse to call with any issues or concerns.

## 2024-07-01 DIAGNOSIS — N18.4 STAGE 4 CHRONIC KIDNEY DISEASE (MULTI): ICD-10-CM

## 2024-07-01 RX ORDER — CALCITRIOL 0.25 UG/1
CAPSULE ORAL
Qty: 45 CAPSULE | Refills: 3 | OUTPATIENT
Start: 2024-07-01

## 2024-07-10 ENCOUNTER — PATIENT OUTREACH (OUTPATIENT)
Dept: PRIMARY CARE | Facility: CLINIC | Age: 84
End: 2024-07-10
Payer: MEDICARE

## 2024-07-10 DIAGNOSIS — N18.4 STAGE 4 CHRONIC KIDNEY DISEASE (MULTI): ICD-10-CM

## 2024-07-10 RX ORDER — CALCITRIOL 0.25 UG/1
CAPSULE ORAL
Qty: 60 CAPSULE | Refills: 3 | OUTPATIENT
Start: 2024-07-10

## 2024-07-17 ENCOUNTER — PATIENT OUTREACH (OUTPATIENT)
Dept: PRIMARY CARE | Facility: CLINIC | Age: 84
End: 2024-07-17
Payer: MEDICARE

## 2024-07-17 DIAGNOSIS — E78.5 HYPERLIPIDEMIA, UNSPECIFIED HYPERLIPIDEMIA TYPE: ICD-10-CM

## 2024-07-17 DIAGNOSIS — I12.9 HYPERTENSION WITH RENAL DISEASE: ICD-10-CM

## 2024-07-17 NOTE — PROGRESS NOTES
Giselle returned call.     She has continued to eat small meals to reduce her nausea..She has been eating healthy    She has been having her son take her out. If she eats out she just brings half of her meal home.   HYPERTENSION.  Her blood pressures have been good with in normal limits  She is very pleased with it.    Ambulation.    She had to walk far and it tired her out.  Discussed endurance and possibly walking in the mall.    Humidity has made it difficult to breath this summer. She stays in and stays in air conditioning and has a fan circulate the air.  No falls. Being careful with uneven surfaces.

## 2024-07-19 ENCOUNTER — PATIENT OUTREACH (OUTPATIENT)
Dept: PRIMARY CARE | Facility: CLINIC | Age: 84
End: 2024-07-19
Payer: MEDICARE

## 2024-07-22 ENCOUNTER — PATIENT OUTREACH (OUTPATIENT)
Dept: PRIMARY CARE | Facility: CLINIC | Age: 84
End: 2024-07-22
Payer: MEDICARE

## 2024-07-22 DIAGNOSIS — M79.89 LEG SWELLING: ICD-10-CM

## 2024-07-22 DIAGNOSIS — I63.9 ACUTE ISCHEMIC STROKE (MULTI): ICD-10-CM

## 2024-07-22 NOTE — PROGRESS NOTES
REQUESTING LETTER BE FAXED TO POST OFFICE CONFIRMING THEY STILL NEED A MAIL BOX CLOSE.  TO THEIR HOUSE. WILL CALL BACK WITH FAX NUMBER.

## 2024-08-19 ENCOUNTER — PATIENT OUTREACH (OUTPATIENT)
Dept: PRIMARY CARE | Facility: CLINIC | Age: 84
End: 2024-08-19
Payer: MEDICARE

## 2024-08-19 DIAGNOSIS — M79.89 LEG SWELLING: ICD-10-CM

## 2024-08-19 DIAGNOSIS — I12.9 HYPERTENSION WITH RENAL DISEASE: ICD-10-CM

## 2024-08-19 DIAGNOSIS — E78.5 HYPERLIPIDEMIA, UNSPECIFIED HYPERLIPIDEMIA TYPE: ICD-10-CM

## 2024-08-19 DIAGNOSIS — I48.0 PAROXYSMAL ATRIAL FIBRILLATION (MULTI): ICD-10-CM

## 2024-08-19 NOTE — PROGRESS NOTES
Spoke with Giselle. She says she is doing well. Only limited nausea. She has been eating well. She has not had any issues. No complaints of GERD.   She has had normal blood pressure readings when she checks it. Has not had any chest pain or racing of heart.    Her ambulation hand endurance has improved enough she feels comfortable driving. She makes sure she stays on even ground and does not ambulate in the yard. She has not had any trips or falls.    She continues to keep well hydrated to help prevent UTI's. She has not had ant s/s of UTI.  Tried not to drink caffenated beverages especially at night.    Has limited her exposure in the high humidity weather. Support given    Giselle will reach out to  if she has any issues  or concerns.

## 2024-08-21 DIAGNOSIS — I48.0 PAROXYSMAL ATRIAL FIBRILLATION (MULTI): Primary | ICD-10-CM

## 2024-08-22 RX ORDER — RIVAROXABAN 15 MG/1
15 TABLET, FILM COATED ORAL DAILY
Qty: 90 TABLET | Refills: 3 | Status: SHIPPED | OUTPATIENT
Start: 2024-08-22

## 2024-09-02 DIAGNOSIS — I63.9 ACUTE ISCHEMIC STROKE (MULTI): ICD-10-CM

## 2024-09-03 RX ORDER — ROSUVASTATIN CALCIUM 10 MG/1
10 TABLET, COATED ORAL DAILY
Qty: 90 TABLET | Refills: 1 | Status: SHIPPED | OUTPATIENT
Start: 2024-09-03

## 2024-09-16 ENCOUNTER — LAB (OUTPATIENT)
Dept: LAB | Facility: LAB | Age: 84
End: 2024-09-16
Payer: MEDICARE

## 2024-09-16 DIAGNOSIS — N18.4 CHRONIC KIDNEY DISEASE, STAGE 4 (SEVERE) (MULTI): Primary | ICD-10-CM

## 2024-09-16 DIAGNOSIS — D46.9 MYELODYSPLASTIC SYNDROME (MULTI): ICD-10-CM

## 2024-09-16 DIAGNOSIS — D64.9 CHRONIC ANEMIA: ICD-10-CM

## 2024-09-16 PROCEDURE — 82728 ASSAY OF FERRITIN: CPT

## 2024-09-16 PROCEDURE — 36415 COLL VENOUS BLD VENIPUNCTURE: CPT

## 2024-09-16 PROCEDURE — 84156 ASSAY OF PROTEIN URINE: CPT

## 2024-09-16 PROCEDURE — 80069 RENAL FUNCTION PANEL: CPT

## 2024-09-16 PROCEDURE — 82306 VITAMIN D 25 HYDROXY: CPT

## 2024-09-16 PROCEDURE — 83970 ASSAY OF PARATHORMONE: CPT

## 2024-09-16 PROCEDURE — 83540 ASSAY OF IRON: CPT

## 2024-09-16 PROCEDURE — 82746 ASSAY OF FOLIC ACID SERUM: CPT

## 2024-09-16 PROCEDURE — 85027 COMPLETE CBC AUTOMATED: CPT

## 2024-09-16 PROCEDURE — 82570 ASSAY OF URINE CREATININE: CPT

## 2024-09-16 PROCEDURE — 82607 VITAMIN B-12: CPT

## 2024-09-16 PROCEDURE — 83735 ASSAY OF MAGNESIUM: CPT

## 2024-09-16 PROCEDURE — 83550 IRON BINDING TEST: CPT

## 2024-09-16 PROCEDURE — 81001 URINALYSIS AUTO W/SCOPE: CPT

## 2024-09-17 ENCOUNTER — PATIENT OUTREACH (OUTPATIENT)
Dept: PRIMARY CARE | Facility: CLINIC | Age: 84
End: 2024-09-17
Payer: MEDICARE

## 2024-09-17 ENCOUNTER — TELEPHONE (OUTPATIENT)
Dept: HEMATOLOGY/ONCOLOGY | Facility: CLINIC | Age: 84
End: 2024-09-17
Payer: MEDICARE

## 2024-09-17 DIAGNOSIS — I12.9 HYPERTENSION WITH RENAL DISEASE: ICD-10-CM

## 2024-09-17 DIAGNOSIS — M79.89 LEG SWELLING: ICD-10-CM

## 2024-09-17 DIAGNOSIS — D64.9 CHRONIC ANEMIA: ICD-10-CM

## 2024-09-17 DIAGNOSIS — I48.0 PAROXYSMAL ATRIAL FIBRILLATION (MULTI): ICD-10-CM

## 2024-09-17 DIAGNOSIS — D46.9 MYELODYSPLASTIC SYNDROME (MULTI): ICD-10-CM

## 2024-09-17 LAB
25(OH)D3 SERPL-MCNC: 52 NG/ML (ref 30–100)
ALBUMIN SERPL BCP-MCNC: 3.7 G/DL (ref 3.4–5)
ANION GAP SERPL CALC-SCNC: 15 MMOL/L (ref 10–20)
APPEARANCE UR: CLEAR
BILIRUB UR STRIP.AUTO-MCNC: NEGATIVE MG/DL
BUN SERPL-MCNC: 28 MG/DL (ref 6–23)
CALCIUM SERPL-MCNC: 8.8 MG/DL (ref 8.6–10.6)
CHLORIDE SERPL-SCNC: 114 MMOL/L (ref 98–107)
CO2 SERPL-SCNC: 17 MMOL/L (ref 21–32)
COLOR UR: ABNORMAL
CREAT SERPL-MCNC: 1.7 MG/DL (ref 0.5–1.05)
CREAT UR-MCNC: 43.1 MG/DL (ref 20–320)
EGFRCR SERPLBLD CKD-EPI 2021: 29 ML/MIN/1.73M*2
ERYTHROCYTE [DISTWIDTH] IN BLOOD BY AUTOMATED COUNT: 14.7 % (ref 11.5–14.5)
FERRITIN SERPL-MCNC: 149 NG/ML (ref 8–150)
FOLATE SERPL-MCNC: 17.4 NG/ML
GLUCOSE SERPL-MCNC: 94 MG/DL (ref 74–99)
GLUCOSE UR STRIP.AUTO-MCNC: NORMAL MG/DL
HCT VFR BLD AUTO: 38.8 % (ref 36–46)
HGB BLD-MCNC: 11.5 G/DL (ref 12–16)
IRON SATN MFR SERPL: 17 % (ref 25–45)
IRON SERPL-MCNC: 47 UG/DL (ref 35–150)
KETONES UR STRIP.AUTO-MCNC: NEGATIVE MG/DL
LEUKOCYTE ESTERASE UR QL STRIP.AUTO: ABNORMAL
MAGNESIUM SERPL-MCNC: 1.81 MG/DL (ref 1.6–2.4)
MCH RBC QN AUTO: 30.4 PG (ref 26–34)
MCHC RBC AUTO-ENTMCNC: 29.6 G/DL (ref 32–36)
MCV RBC AUTO: 103 FL (ref 80–100)
MUCOUS THREADS #/AREA URNS AUTO: ABNORMAL /LPF
NITRITE UR QL STRIP.AUTO: NEGATIVE
NRBC BLD-RTO: 0 /100 WBCS (ref 0–0)
PH UR STRIP.AUTO: 5.5 [PH]
PHOSPHATE SERPL-MCNC: 3.8 MG/DL (ref 2.5–4.9)
PLATELET # BLD AUTO: 191 X10*3/UL (ref 150–450)
POTASSIUM SERPL-SCNC: 4 MMOL/L (ref 3.5–5.3)
PROT UR STRIP.AUTO-MCNC: ABNORMAL MG/DL
PROT UR-ACNC: 42 MG/DL (ref 5–24)
PROT/CREAT UR: 0.97 MG/MG CREAT (ref 0–0.17)
PTH-INTACT SERPL-MCNC: 143.7 PG/ML (ref 18.5–88)
RBC # BLD AUTO: 3.78 X10*6/UL (ref 4–5.2)
RBC # UR STRIP.AUTO: NEGATIVE /UL
RBC #/AREA URNS AUTO: ABNORMAL /HPF
SODIUM SERPL-SCNC: 142 MMOL/L (ref 136–145)
SP GR UR STRIP.AUTO: 1.01
TIBC SERPL-MCNC: 283 UG/DL (ref 240–445)
UIBC SERPL-MCNC: 236 UG/DL (ref 110–370)
UROBILINOGEN UR STRIP.AUTO-MCNC: NORMAL MG/DL
VIT B12 SERPL-MCNC: >2000 PG/ML (ref 211–911)
WBC # BLD AUTO: 6.5 X10*3/UL (ref 4.4–11.3)
WBC #/AREA URNS AUTO: ABNORMAL /HPF

## 2024-09-17 NOTE — TELEPHONE ENCOUNTER
Chart reviewed by RN.  Noted no current Iron orders.  Will cancel 9/18, 9/23 and 9/26 appts.  Call placed to patient.  No answer.  Message left on identifiable voicemail with call back instructions notifying patient not to come 9/18 or 9/23.  Requested call back to confirm.  Message sent to urgent scheduling pool to cancel.

## 2024-09-17 NOTE — TELEPHONE ENCOUNTER
Call returned to patient.  Notified that Iron was scheduled mistakenly off of  orders and that in order to order Iron the provider would need to evaluate current labs.  Most current Iron studies are from April.  RN able to add Iron studies and ordered labs on to labs drawn yesterday by her kidney doctor.  Unable to add Copper.  Will have copper drawn at  appt.  Patient aware will cancel Iron for now and discuss further at  scheduled [facility].  Call back instructions reviewed.  Patient verbalized understanding.

## 2024-09-17 NOTE — TELEPHONE ENCOUNTER
Patient and  want iron tomorrow.  They are leaving for [place] at the end of the month.      Please call patient and advise.

## 2024-09-17 NOTE — PROGRESS NOTES
Spoke with Giselle today. She states she is still having occasional nausea usually at breakfast time. She has been eating well.  No issues presently with GERD/ hiatal hernia.  Her blood pressures have been good when she checks them.  No heart issues. No racing or swollen feet or legs. She states she has been super careful ambulating. She does not want a fall like last year.  Giselle has not had any signs of UTI symptoms and continues to stay well hydrated.    Encouraged both her and avelino to call if they have any concerns.

## 2024-09-18 ENCOUNTER — APPOINTMENT (OUTPATIENT)
Dept: HEMATOLOGY/ONCOLOGY | Facility: CLINIC | Age: 84
End: 2024-09-18
Payer: MEDICARE

## 2024-09-18 NOTE — TELEPHONE ENCOUNTER
From: Henrik Macdonald MD   Sent: 9/17/2024  12:06 PM EDT   To: Elva De Paz MA     Please inform that above test result is stable     Call placed to patient.  Notified of providers message above.  No Iron ordered at this time.  Follow-up as scheduled next week.  Call back instructions reviewed.  Patient verbalized understanding.

## 2024-09-20 ENCOUNTER — APPOINTMENT (OUTPATIENT)
Dept: PRIMARY CARE | Facility: CLINIC | Age: 84
End: 2024-09-20
Payer: MEDICARE

## 2024-09-23 ENCOUNTER — APPOINTMENT (OUTPATIENT)
Dept: HEMATOLOGY/ONCOLOGY | Facility: CLINIC | Age: 84
End: 2024-09-23
Payer: MEDICARE

## 2024-09-24 ENCOUNTER — OFFICE VISIT (OUTPATIENT)
Dept: HEMATOLOGY/ONCOLOGY | Facility: CLINIC | Age: 84
End: 2024-09-24
Payer: MEDICARE

## 2024-09-24 VITALS
SYSTOLIC BLOOD PRESSURE: 167 MMHG | HEART RATE: 77 BPM | OXYGEN SATURATION: 91 % | DIASTOLIC BLOOD PRESSURE: 106 MMHG | BODY MASS INDEX: 24.66 KG/M2 | RESPIRATION RATE: 16 BRPM | TEMPERATURE: 97.9 F | WEIGHT: 139.22 LBS

## 2024-09-24 DIAGNOSIS — D46.9 MYELODYSPLASTIC SYNDROME (MULTI): ICD-10-CM

## 2024-09-24 DIAGNOSIS — D64.9 CHRONIC ANEMIA: ICD-10-CM

## 2024-09-24 PROBLEM — J45.909 REACTIVE AIRWAY DISEASE WITHOUT COMPLICATION (HHS-HCC): Status: RESOLVED | Noted: 2022-05-20 | Resolved: 2024-09-24

## 2024-09-24 PROBLEM — M97.8XXA PERI-PROSTHETIC SUPRACONDYLAR FRACTURE OF FEMUR: Status: RESOLVED | Noted: 2023-09-25 | Resolved: 2024-09-24

## 2024-09-24 PROBLEM — S63.8X2A TEAR OF LEFT SCAPHOLUNATE LIGAMENT: Status: RESOLVED | Noted: 2023-04-25 | Resolved: 2024-09-24

## 2024-09-24 PROBLEM — S72.90XA CLOSED FRACTURE OF FEMUR (MULTI): Status: RESOLVED | Noted: 2024-04-11 | Resolved: 2024-09-24

## 2024-09-24 PROBLEM — Z96.659 PERI-PROSTHETIC SUPRACONDYLAR FRACTURE OF FEMUR: Status: RESOLVED | Noted: 2023-09-25 | Resolved: 2024-09-24

## 2024-09-24 PROCEDURE — 99213 OFFICE O/P EST LOW 20 MIN: CPT | Performed by: INTERNAL MEDICINE

## 2024-09-24 PROCEDURE — G2211 COMPLEX E/M VISIT ADD ON: HCPCS | Performed by: INTERNAL MEDICINE

## 2024-09-24 PROCEDURE — 3080F DIAST BP >= 90 MM HG: CPT | Performed by: INTERNAL MEDICINE

## 2024-09-24 PROCEDURE — 3077F SYST BP >= 140 MM HG: CPT | Performed by: INTERNAL MEDICINE

## 2024-09-24 PROCEDURE — 1159F MED LIST DOCD IN RCRD: CPT | Performed by: INTERNAL MEDICINE

## 2024-09-24 PROCEDURE — 1126F AMNT PAIN NOTED NONE PRSNT: CPT | Performed by: INTERNAL MEDICINE

## 2024-09-24 ASSESSMENT — PAIN SCALES - GENERAL: PAINLEVEL: 0-NO PAIN

## 2024-09-24 NOTE — PROGRESS NOTES
Patient ID: Giselle Escobar is a 84 y.o. female.  Referring Physician: Henrik Macdonald MD  [address], [address]  [place] [address]  Primary Care Provider: Makenna Zafar DO    ORDERS & PATIENT INSTRUCTIONS:      RTC 6-month  CBC, iron group, ferritin, B12, S copper          ASSESSMENT, PROBLEM LIST, DECISION MAKING, PLAN.    1. Myelodysplastic syndrome/refractory anemia diagnosed in April 2010. bone marrow biopsy was otherwise negativeIn April 2010  Patient was initially treated with Procrit but after several months hemoglobin stabilized so Procrit was discontinued and has been on watchful waiting   2. Hyperhomocystinemia.   3. Gastric bypass and history of low estevan level.  On replacement   4. Hypertension.   5.  CVA in August 2023        INTERVAL HISTORY: The patient is here today for followup on MDS.  PHYSICAL EXAMINATION:    General: Conscious, alert, oriented x3, not in acute distress.  HEENT:    No icterus.    Chest:Bilateral symmetrical,     CVS:  S1, S2.    Abdomen:  Soft, no palpable mass   Extremities: No clubbing, cyanosis,     Skin: No petechial rash.      ASSESSMENT AND PLAN: Ms. Escobar with refractory anemia/myelodysplastic syndrome whose hemoglobin is reasonably stable. Continue watchful waiting. No further workup. Patient has not received Procrit for several years and her hemoglobin stabilized so it was discontinued     Patient had multiple issues with stroke on August 31, 2023, broken femur in September 2023, COVID,       She is on Xarelto because of stroke and currently on watchful waiting  Patient's hemoglobin dropped to 9.6 g/dL in May 2024, but now has recovered.  Will monitor for now    Patient has a history of gastric bypass-  periodic monitoring of iron studies and serum B12 folate and copper level,   History of copper deficiency, continue multivitamin with copper, continue sublingual B12 tablet  We are awaiting iron studies B12 folate and copper  level, patient was advised to continue sublingual B12 tablet      Patient had CVA in August 2023 and was admitted at [facility] and had left hemiparesis and dysarthria, later she fell and broke her femur in September 2023 requiring internal fixation at [facility].    Patient is planning to go to [place] next month and will now return for follow-up in 6 months when she comes back      VITALS:   1.68 meters squared BP (!) 167/106   Pulse 77   Temp 36.6 °C (97.9 °F)   Resp 16   Wt 63.2 kg (139 lb 3.5 oz)   SpO2 91%   BMI 24.66 kg/m²     LABS:    CBC:  Recent Labs     09/16/24  1646 05/20/24  1530 04/23/24  1007 02/02/24  1227 01/05/24  1341   WBC 6.5 5.4 6.6 5.6 5.8   HGB 11.5* 9.6* 11.0* 10.8* 10.9*   HCT 38.8 31.4* 36.6 35.1* 38.4    212 192 248 255   * 99 100 94 98       CMP:  Recent Labs     09/16/24  1646 05/20/24  1530 02/02/24  1227 01/05/24  1341 08/21/23  1141 10/04/22  1016 08/03/22  1134 05/06/22  1038 09/07/21  1023 05/05/21  0917 10/30/20  0735    142 143 141 139   < > 142   < > 141 141 144   K 4.0 4.5 4.0 3.9 5.1   < > 4.9   < > 4.6 3.9 4.2   * 112* 109* 105 111*   < > 113*   < > 112* 110* 111*   CO2 17* 20* 24 28 17*   < > 17*   < > 19* 21 26   ANIONGAP 15 15 14 12 16   < > 17   < > 15 14 11   BUN 28* 29* 26* 23 32*   < > 29*   < > 37* 30* 29*   CREATININE 1.70* 1.61* 1.37* 1.36* 1.84*   < > 1.66*   < > 1.77* 1.80* 1.70*   EGFR 29* 31* 38* 38*  --   --   --   --   --   --   --    MG 1.81  --   --   --   --   --  2.24  --  2.15 2.26 2.08    < > = values in this interval not displayed.     Recent Labs     09/16/24  1646 05/20/24  1530 01/05/24  1341 08/21/23  1141 05/16/23  1409 10/04/22  1016 08/03/22  1134 05/06/22  1038 05/28/19  0835 04/08/19  0756   ALBUMIN 3.7 3.1* 3.5 4.0 3.8 4.1   < > 4.2   < > 3.9   ALKPHOS  --   --  116  --  86 92  --  79  --  57   ALT  --   --  17  --  21 31  --  19  --  14   AST  --   --  24  --  17 22  --  18  --  20   BILITOT  --   --  " 0.4  --  0.4 0.4  --  0.4  --  0.4    < > = values in this interval not displayed.       HEME/ENDO:  Recent Labs     09/16/24  1646 04/23/24  1007 09/19/23  0939 05/16/23  1409 04/26/23  1048 06/03/22  0956 05/06/22  1038 02/23/21  0000 06/23/20  1000 08/17/18  0904 05/22/18  0954   FERRITIN 149 138 37 73 117   < > 55   < > 36   < >  --    IRONSAT 17* 25 15* 14* 18*   < > 20*   < > 26   < >  --    TSH  --   --   --   --   --   --  1.58  --   --   --  1.28   [id number] >2,000*  --  1,285* 1,030* >2000*   < >  --    < > 1,395*   < >  --    FOLATE 17.4  --   --   --  >24.0  --   --   --  >24.0  --   --     < > = values in this interval not displayed.        MICRO:   Recent Labs     02/02/24  1227   CRP 0.87     No results found for the last 90 days.        TUMOR MARKERS:  No results found for: \"LABCA2\", \"CEA\", \"\", \"PSA\", \"AFPTM\", \"HCGTM\", \"\"             IMAGING:         Transthoracic Echo (TTE) [facility], [address] [place] [address], [address] [address]                   Tel [phone] and Fax [phone]    TRANSTHORACIC ECHOCARDIOGRAM REPORT       Patient Name:      THONY MILLER      Reading Physician:    [id number] Cristiano Pedraza MD  Study Date:        4/24/2024            Ordering Provider:    [id number] EMILY BERMAN  MRN/PID:           [id number]             Fellow:  Accession#:        [id number]         Nurse:  Date of Birth/Age: 1940 / 83 years  Sonographer:          MERY Chatterjee RDCS  Gender:            F                    Additional Staff:  Height:            160.02 cm            Admit Date:  Weight:            67.13 kg             Admission Status:     Outpatient  BSA / BMI:         1.70 m2 / 26.22      Encounter#:           [id number]                     kg/m2                 "                          Department Location:  [facility]  Blood Pressure: 166 /80 mmHg    Study Type:    TRANSTHORACIC ECHO (TTE) COMPLETE  Diagnosis/ICD: Nonrheumatic aortic (valve) stenosis-I35.0  Indication:    F/U Aortic stenosis  CPT Code:      Echo Complete w Full Doppler-[id number]    Patient History:  Pertinent History: Mild aortic stenosis, CAD, HLD, renal disease, paroxysmal                     A-fib, myelodysplastic syndrome, LE edema.    Study Detail: The following Echo studies were performed: 2D, M-Mode, Doppler and                color flow. Patient's heart rhythm is atrial fibrillation.       PHYSICIAN INTERPRETATION:  Left Ventricle: The left ventricular systolic function is normal, with an estimated ejection fraction of 60-65%. There are no regional wall motion abnormalities. The left ventricular cavity size is normal. There is left ventricular concentric remodeling. Left ventricular diastolic filling was indeterminate. There is a mild left ventricular outflow tract obstruction with the Valsalva maneuver.  Left Atrium: The left atrium is severely dilated.  Right Ventricle: The right ventricle is normal in size. There is normal right ventricular global systolic function.  Right Atrium: The right atrium is normal in size. Prominent bud giulia network.  Aortic Valve: The aortic valve is trileaflet. There is mild to moderate aortic valve cusp calcification. There is moderate aortic valve thickening. There is There is reduced systolic aortic valve leaflet excursion. There is aortic valve annular calcification. There is evidence of mild to moderate aortic valve stenosis.  The peak aortic velocity was obtained from the apical view. There is trivial aortic valve regurgitation. The peak instantaneous gradient of the aortic valve is 10.4 mmHg. The mean gradient of the aortic valve is 6.0 mmHg.  Mitral Valve: The mitral valve is moderately thickened. The mitral valve spectral Doppler A-wave is absent,  consistent with atrial fibrillation. There is evidence of mild mitral valve stenosis. The doppler estimated mean and peak diastolic pressure gradients are 4.7 mmHg and 7.8 mmHg respectively. There is moderate to severe mitral annular calcification. There is trace mitral valve regurgitation.  Tricuspid Valve: The tricuspid valve is structurally normal. There is mild tricuspid regurgitation. The Doppler estimated RVSP is slightly elevated at 34.2 mmHg.  Pulmonic Valve: The pulmonic valve is structurally normal. There is no indication of pulmonic valve regurgitation.  Pericardium: There is a trivial pericardial effusion.  Aorta: The aortic root is normal.       CONCLUSIONS:   1. Left ventricular systolic function is normal with a 60-65% estimated ejection fraction.   2. Absent A-wave on MV spectral Doppler tracing, consistent with atrial fibrillation.   3. The left atrium is severely dilated.   4. There is moderate to severe mitral annular calcification.   5. Moderate aortic valve stenosis. Gradients across the aortic valve do not meet significance, however planimetered measurements (1.1 cm2) and gross appearance of the valve indicate stenotic disease. Recommend follow up echocardiogram in 12 months for further characterization.   6. Slightly elevated RVSP.    QUANTITATIVE DATA SUMMARY:  2D MEASUREMENTS:                           Normal Ranges:  Ao Root d:     3.10 cm   (2.0-3.7cm)  LAs:           5.26 cm   (2.7-4.0cm)  RVIDd:         2.16 cm   (0.9-3.6cm)  IVSd:          1.01 cm   (0.6-1.1cm)  LVPWd:         1.05 cm   (0.6-1.1cm)  LVIDd:         3.30 cm   (3.9-5.9cm)  LVIDs:         2.08 cm  LV Mass Index: 58.4 g/m2  LV % FS        36.8 %    LA VOLUME:                                Normal Ranges:  LA Vol A4C:        66.5 ml    (22+/-6mL/m2)  LA Vol A2C:        144.1 ml  LA Vol BP:         102.4 ml  LA Vol Index A4C:  39.1 ml/m2  LA Vol Index A2C:  84.7 ml/m2  LA Vol Index BP:   60.2 ml/m2  LA Area A4C:       22.2  cm2  LA Area A2C:       34.2 cm2  LA Major Axis A4C: 6.3 cm  LA Major Axis A2C: 6.9 cm  LA Volume Index:   60.3 ml/m2  LA Vol A4C:        62.9 ml  LA Vol A2C:        133.9 ml    M-MODE MEASUREMENTS:                   Normal Ranges:  Ao Root: 2.80 cm (2.0-3.7cm)  LAs:     5.42 cm (2.7-4.0cm)    AORTA MEASUREMENTS:                     Normal Ranges:  Asc Ao, d: 2.90 cm (2.1-3.4cm)    LV SYSTOLIC FUNCTION BY 2D PLANIMETRY (MOD):                      Normal Ranges:  EF-A4C View: 81.8 % (>=55%)  EF-A2C View: 72.9 %  EF-Biplane:  76.9 %    LV DIASTOLIC FUNCTION:                             Normal Ranges:  MV Peak E:      1.24 m/s   (0.7-1.2 m/s)  MV e'           0.06 m/s   (>8.0)  MV lateral e'   0.07 m/s  MV medial e'    0.05 m/s  E/e' Ratio:     20.62      (<8.0)  PulmV Sys Tian:  20.41 cm/s  PulmV Dawn Tian: 44.12 cm/s  PulmV S/D Tian:  0.46    MITRAL VALVE:                       Normal Ranges:  MV Vmax:    1.40 m/s (<=1.3m/s)  MV peak P.8 mmHg (<5mmHg)  MV mean P.7 mmHg (<2mmHg)  MV VTI:     28.99 cm (10-13cm)  MV DT:      143 msec (150-240msec)    AORTIC VALVE:                                     Normal Ranges:  AoV Vmax:                1.61 m/s  (<=1.7m/s)  AoV Peak PG:             10.4 mmHg (<20mmHg)  AoV Mean P.0 mmHg  (1.7-11.5mmHg)  LVOT Max Tian:            1.01 m/s  (<=1.1m/s)  AoV VTI:                 29.30 cm  (18-25cm)  LVOT VTI:                18.53 cm  LVOT Diameter:           2.01 cm   (1.8-2.4cm)  AoV Area, VTI:           2.00 cm2  (2.5-5.5cm2)  AoV Area,Vmax:           1.98 cm2  (2.5-4.5cm2)  AoV Area, planim:        1.07 cm2  (2.5-4.5cm2)  AoV Dimensionless Index: 0.63       RIGHT VENTRICLE:  RV Basal 3.00 cm  RV Mid   2.20 cm  RV Major 6.2 cm  TAPSE:   14.7 mm  RV s'    0.10 m/s    TRICUSPID VALVE/RVSP:                              Normal Ranges:  Peak TR Velocity: 2.79 m/s  RV Syst Pressure: 34.2 mmHg (< 30mmHg)  IVC Diam:         1.40 cm    PULMONIC VALVE:                           Normal Ranges:  PV Accel Time: 46 msec  (>120ms)  PV Max Tian:    1.0 m/s  (0.6-0.9m/s)  PV Max P.4 mmHg    Pulmonary Veins:  PulmV Dawn Tian: 44.12 cm/s  PulmV S/D Tian:  0.46  PulmV Sys Tian:  20.41 cm/s    AORTA:  Asc Ao Diam 2.92 cm       [id number] Cristiano Pedraza MD  Electronically signed on 2024 at 10:36:16 AM       ** Final **       Current Outpatient Medications   Medication Sig Dispense Refill    ascorbic acid (Vitamin C) 500 mg chewable tablet Chew 1 tablet (500 mg) once daily.      calcitriol (Rocaltrol) 0.25 mcg capsule Take 1 capsule 3 times weekly 45 capsule 3    calcium carbonate-vitamin D3 600 mg-10 mcg (400 unit) tablet Take by mouth.      cyanocobalamin (Vitamin B-12) 1,000 mcg tablet Take 100 mcg by mouth once daily.      ferrous sulfate 325 (65 Fe) MG EC tablet Take 65 mg by mouth 2 times a day. Do not crush, chew, or split.      furosemide (Lasix) 20 mg tablet Take 1 tablet (20 mg) by mouth once daily.      ipratropium-albuteroL (Duo-Neb) 0.5-2.5 mg/3 mL nebulizer solution USE 3 ML VIA NEBULIZER EVERY 4 HOURS AS NEEDED FO SHORTNESS OF BREATH      magnesium 250 mg tablet Take 1 tablet (250 mg) by mouth.      metoprolol succinate XL (Toprol-XL) 25 mg 24 hr tablet Take 0.5 tablets (12.5 mg) by mouth once daily.      mirtazapine (Remeron) 7.5 mg tablet TAKE 1 TABLET BY MOUTH EVERY NIGHT AT BEDTIME FOR POOR APPETITE      multivitamin tablet once every 24 hours.      omeprazole (PriLOSEC) 20 mg DR capsule TAKE 1 CAPSULE BY MOUTH ONCE  DAILY 90 capsule 3    rosuvastatin (Crestor) 10 mg tablet TAKE 1 TABLET BY MOUTH ONCE  DAILY 90 tablet 1    Xarelto 15 mg tablet TAKE 1 TABLET BY MOUTH DAILY 90 tablet 3     No current facility-administered medications for this visit.                  SOCIAL HISTORY:    reports that she does not currently use alcohol.   reports no history of drug use.,   Tobacco Use: Medium Risk (2024)    Received from [facility]    Patient History     Smoking Tobacco  Use: Former     Smokeless Tobacco Use: Never     Passive Exposure: Past       FAMILY HISTORY:  Family History   Problem Relation Name Age of Onset    Diabetes Mother Dd     Heart attack Mother Dd     Heart attack Father      ALS Son         ALLERGY:   Dye, Lisinopril, Morphine, and Iodinated contrast media              Medication reviewed in e-chart  Patient is monitored for medication toxicity  labs reviewed and interpreted independently, X rays independently reviewed  Notes from other physicians involved in care were reviewed    Charting was completed using voice recognition technology and may include unintended errors.    TAY DOMÍNGUEZ MD, VINNIE.    Bri Novak Master Clinician in Hematology and Oncology  Medical Director, [facility] at [facility].  [place] office  Phone [phone]  Fax      [phone]  [facility].  Phone [phone]  Fax     [phone]

## 2024-09-24 NOTE — PROGRESS NOTES
Subjective   Reason for Visit: Giselle Escobar is an 84 y.o. female here for a Medicare Wellness visit.     Past Medical, Surgical, and Family History reviewed and updated in chart.    Reviewed all medications by prescribing practitioner or clinical pharmacist (such as prescriptions, OTCs, herbal therapies and supplements) and documented in the medical record.    HPI    HPOA-  [place], son Maxwell    Going to [place] for the winter- will be back in April     CKD stage 4- following with Dr. Neelam Lambert   Cr 1.7, GFR 29   She is on calcitriol 3 times weekly     A fib, CAD - on xarelto, metoprolol- sees Shabnam Del Rio - restarted her lasix last visit  - not taking now - just uses as needed for swelling   Echo 4/2024 EF 60-65%, moderate aortic stenosis - needs repeat 1 year (due April 2025)     Stroke in Aug 2023- saw neurology who added demetra- Cheryl Quiles CNP  She also saw Magalys ALVARADO for incidental bilateral cavernous ICA aneurysms- advised MRA brain wo contrast in 1 year- due now     Barretts- on PPI  EGD 2022- Dr. Rojas    Osteopenia- dexa 2019 - declines     Follows with urology for recurrent UTIs, urinary retention     MDS- Dr. Macdonald - saw him yesterday- monitoring   Hb 11.5     Labs current     Growth skin left arm x months   Scratches it and it keeps getting bigger   No hx skin cancer     Current Outpatient Medications   Medication Sig Dispense Refill    ascorbic acid (Vitamin C) 500 mg chewable tablet Chew 1 tablet (500 mg) once daily.      calcitriol (Rocaltrol) 0.25 mcg capsule Take 1 capsule 3 times weekly 45 capsule 3    calcium carbonate-vitamin D3 600 mg-10 mcg (400 unit) tablet Take by mouth.      cyanocobalamin (Vitamin B-12) 1,000 mcg tablet Take 100 mcg by mouth once daily.      ferrous sulfate 325 (65 Fe) MG EC tablet Take 65 mg by mouth 2 times a day. Do not crush, chew, or split.      furosemide (Lasix) 20 mg tablet Take 1 tablet (20 mg) by mouth once daily.       ipratropium-albuteroL (Duo-Neb) 0.5-2.5 mg/3 mL nebulizer solution USE 3 ML VIA NEBULIZER EVERY 4 HOURS AS NEEDED FO SHORTNESS OF BREATH      magnesium 250 mg tablet Take 1 tablet (250 mg) by mouth.      mirtazapine (Remeron) 7.5 mg tablet TAKE 1 TABLET BY MOUTH EVERY NIGHT AT BEDTIME FOR POOR APPETITE      multivitamin tablet once every 24 hours.      omeprazole (PriLOSEC) 20 mg DR capsule TAKE 1 CAPSULE BY MOUTH ONCE  DAILY 90 capsule 3    rosuvastatin (Crestor) 10 mg tablet TAKE 1 TABLET BY MOUTH ONCE  DAILY 90 tablet 1    Xarelto 15 mg tablet TAKE 1 TABLET BY MOUTH DAILY 90 tablet 3    metoprolol succinate XL (Toprol-XL) 25 mg 24 hr tablet Take 0.5 tablets (12.5 mg) by mouth once daily. 45 tablet 3     No current facility-administered medications for this visit.         Patient Care Team:  Makenna Zafar DO as PCP - General  Henrik Macdonald MD as Consulting Physician (Hematology and Oncology)  Stevenson Mcnally MD as Consulting Physician (Nephrology)  Johnson Rojas MD as Referring Physician (Gastroenterology)  Dev De La Rosa MD as Consulting Physician (Cardiology)  DERRICK Yadav-CNP as Referring Physician (Neurology)  Juan Manuel Connor as Care Manager (Case Management)     Review of Systems   Constitutional:  Negative for chills, fatigue and fever.   HENT:  Negative for congestion, ear pain and sore throat.    Eyes:  Negative for visual disturbance.   Respiratory:  Negative for cough and shortness of breath.    Cardiovascular:  Negative for chest pain, palpitations and leg swelling.   Gastrointestinal:  Negative for abdominal pain, constipation, diarrhea, nausea and vomiting.   Genitourinary: Negative.    Musculoskeletal: Negative.    Skin:  Negative for rash.   Neurological:  Negative for dizziness, weakness, numbness and headaches.   Psychiatric/Behavioral: Negative.         Objective   Vitals:  /79 (BP Location: Left arm, Patient Position: Sitting, BP Cuff Size: Adult)   " Pulse 69   Temp 36.2 °C (97.1 °F) (Temporal)   Resp 16   Ht 1.6 m (5' 3\")   Wt 63.1 kg (139 lb 1.6 oz)   SpO2 96%   BMI 24.64 kg/m²       Physical Exam    Constitutional: Well developed, well nourished, alert and in no acute distress   Eyes: Normal external exam. Pupils equally round and reactive to light with normal accommodation and extraocular movements intact.  Cardiovascular: Irregular rate and rhythm, normal S1 and S2, no murmurs, gallops, or rubs. Radial pulses normal. 1+ peripheral edema.  Pulmonary: No respiratory distress, lungs clear to auscultation bilaterally. No wheezes, rhonchi, rales.  Skin: Warm, well perfused, normal skin turgor and color. 2x1.5 cm hyperkeratotic cutaneous horn with erythematous base on left forearm.    Neurologic: Cranial nerves II-XII grossly intact.   Psychiatric: Mood calm and affect normal.      Assessment & Plan  Immunization due    Orders:    Flu vaccine, trivalent, preservative free, HIGH-DOSE, age 65y+ (Fluzone)    Routine general medical examination at health care facility    Orders:    1 Year Follow Up In [facility] - PCP - Wellness Exam; Future    Brain aneurysm ([facility])    Orders:    MR angio head wo IV contrast; Future    Paroxysmal atrial fibrillation (Multi)  Continue xarelto for stroke prevention   Continue metoprolol for rate control  Follows with cardiology   Orders:    metoprolol succinate XL (Toprol-XL) 25 mg 24 hr tablet; Take 0.5 tablets (12.5 mg) by mouth once daily.    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 Macdonald  Labs reviewed- stable        Chronic anemia         Osteopenia, unspecified location  DEXA declined          History of ischemic stroke         Skin lesion of left arm  Concern for SCC left forearm- advised dermatology referral  She declines at this time, \"will consider it in 6 months\"          Other medical specialists are involved in patient's care.    Any recent notes that were available were reviewed.    All conditions are monitored, evaluated and assessed regularly.    Patient is compliant with current treatment regimen/medications.    Medicare annual wellness visit, subsequent            Makenna Zafar, DO  9/25/2024       "

## 2024-09-25 ENCOUNTER — APPOINTMENT (OUTPATIENT)
Dept: PRIMARY CARE | Facility: CLINIC | Age: 84
End: 2024-09-25
Payer: MEDICARE

## 2024-09-25 VITALS
RESPIRATION RATE: 16 BRPM | DIASTOLIC BLOOD PRESSURE: 79 MMHG | SYSTOLIC BLOOD PRESSURE: 154 MMHG | BODY MASS INDEX: 24.64 KG/M2 | HEART RATE: 69 BPM | WEIGHT: 139.1 LBS | TEMPERATURE: 97.1 F | HEIGHT: 63 IN | OXYGEN SATURATION: 96 %

## 2024-09-25 DIAGNOSIS — I48.0 PAROXYSMAL ATRIAL FIBRILLATION (MULTI): ICD-10-CM

## 2024-09-25 DIAGNOSIS — I27.0 PRIMARY PULMONARY HYPERTENSION (MULTI): ICD-10-CM

## 2024-09-25 DIAGNOSIS — N18.4 STAGE 4 CHRONIC KIDNEY DISEASE (MULTI): ICD-10-CM

## 2024-09-25 DIAGNOSIS — M85.80 OSTEOPENIA, UNSPECIFIED LOCATION: ICD-10-CM

## 2024-09-25 DIAGNOSIS — D46.9 MYELODYSPLASTIC SYNDROME (MULTI): ICD-10-CM

## 2024-09-25 DIAGNOSIS — K22.70 BARRETT'S ESOPHAGUS WITHOUT DYSPLASIA: ICD-10-CM

## 2024-09-25 DIAGNOSIS — Z86.73 HISTORY OF ISCHEMIC STROKE: ICD-10-CM

## 2024-09-25 DIAGNOSIS — I42.1 HYPERTROPHIC OBSTRUCTIVE CARDIOMYOPATHY (MULTI): ICD-10-CM

## 2024-09-25 DIAGNOSIS — I67.1 BRAIN ANEURYSM (HHS-HCC): ICD-10-CM

## 2024-09-25 DIAGNOSIS — Z00.00 MEDICARE ANNUAL WELLNESS VISIT, SUBSEQUENT: ICD-10-CM

## 2024-09-25 DIAGNOSIS — I25.10 CORONARY ARTERY DISEASE INVOLVING NATIVE CORONARY ARTERY OF NATIVE HEART WITHOUT ANGINA PECTORIS: ICD-10-CM

## 2024-09-25 DIAGNOSIS — I35.0 AORTIC STENOSIS, MODERATE: ICD-10-CM

## 2024-09-25 DIAGNOSIS — L98.9 SKIN LESION OF LEFT ARM: ICD-10-CM

## 2024-09-25 DIAGNOSIS — Z23 IMMUNIZATION DUE: ICD-10-CM

## 2024-09-25 DIAGNOSIS — E78.5 HYPERLIPIDEMIA, UNSPECIFIED HYPERLIPIDEMIA TYPE: ICD-10-CM

## 2024-09-25 DIAGNOSIS — Z00.00 ROUTINE GENERAL MEDICAL EXAMINATION AT HEALTH CARE FACILITY: Primary | ICD-10-CM

## 2024-09-25 DIAGNOSIS — N25.81 HYPERPARATHYROIDISM DUE TO RENAL INSUFFICIENCY (MULTI): ICD-10-CM

## 2024-09-25 DIAGNOSIS — D64.9 CHRONIC ANEMIA: ICD-10-CM

## 2024-09-25 PROBLEM — I63.9 ACUTE ISCHEMIC STROKE (MULTI): Status: RESOLVED | Noted: 2023-08-31 | Resolved: 2024-09-25

## 2024-09-25 PROBLEM — I12.9 HYPERTENSION WITH RENAL DISEASE: Status: RESOLVED | Noted: 2023-04-25 | Resolved: 2024-09-25

## 2024-09-25 PROBLEM — R33.9 URINARY RETENTION: Status: RESOLVED | Noted: 2023-11-28 | Resolved: 2024-09-25

## 2024-09-25 PROCEDURE — 99214 OFFICE O/P EST MOD 30 MIN: CPT | Performed by: FAMILY MEDICINE

## 2024-09-25 PROCEDURE — 1160F RVW MEDS BY RX/DR IN RCRD: CPT | Performed by: FAMILY MEDICINE

## 2024-09-25 PROCEDURE — 1158F ADVNC CARE PLAN TLK DOCD: CPT | Performed by: FAMILY MEDICINE

## 2024-09-25 PROCEDURE — 1123F ACP DISCUSS/DSCN MKR DOCD: CPT | Performed by: FAMILY MEDICINE

## 2024-09-25 PROCEDURE — 1036F TOBACCO NON-USER: CPT | Performed by: FAMILY MEDICINE

## 2024-09-25 PROCEDURE — 1159F MED LIST DOCD IN RCRD: CPT | Performed by: FAMILY MEDICINE

## 2024-09-25 PROCEDURE — 90662 IIV NO PRSV INCREASED AG IM: CPT | Performed by: FAMILY MEDICINE

## 2024-09-25 PROCEDURE — G0439 PPPS, SUBSEQ VISIT: HCPCS | Performed by: FAMILY MEDICINE

## 2024-09-25 PROCEDURE — G0008 ADMIN INFLUENZA VIRUS VAC: HCPCS | Performed by: FAMILY MEDICINE

## 2024-09-25 PROCEDURE — 1170F FXNL STATUS ASSESSED: CPT | Performed by: FAMILY MEDICINE

## 2024-09-25 RX ORDER — METOPROLOL SUCCINATE 25 MG/1
12.5 TABLET, EXTENDED RELEASE ORAL DAILY
Qty: 45 TABLET | Refills: 3 | Status: SHIPPED | OUTPATIENT
Start: 2024-09-25 | End: 2025-09-25

## 2024-09-25 ASSESSMENT — ENCOUNTER SYMPTOMS
NUMBNESS: 0
DIZZINESS: 0
DIARRHEA: 0
NAUSEA: 0
MUSCULOSKELETAL NEGATIVE: 1
WEAKNESS: 0
FEVER: 0
CONSTIPATION: 0
HEADACHES: 0
ABDOMINAL PAIN: 0
PSYCHIATRIC NEGATIVE: 1
FATIGUE: 0
PALPITATIONS: 0
CHILLS: 0
COUGH: 0
SORE THROAT: 0
VOMITING: 0
SHORTNESS OF BREATH: 0

## 2024-09-25 ASSESSMENT — PATIENT HEALTH QUESTIONNAIRE - PHQ9
SUM OF ALL RESPONSES TO PHQ9 QUESTIONS 1 AND 2: 0
1. LITTLE INTEREST OR PLEASURE IN DOING THINGS: NOT AT ALL
2. FEELING DOWN, DEPRESSED OR HOPELESS: NOT AT ALL

## 2024-09-25 ASSESSMENT — ACTIVITIES OF DAILY LIVING (ADL)
DOING_HOUSEWORK: INDEPENDENT
TAKING_MEDICATION: INDEPENDENT
DRESSING: INDEPENDENT
MANAGING_FINANCES: INDEPENDENT
BATHING: INDEPENDENT
GROCERY_SHOPPING: INDEPENDENT

## 2024-09-25 NOTE — ASSESSMENT & PLAN NOTE
Continue xarelto for stroke prevention   Continue metoprolol for rate control  Follows with cardiology   Orders:    metoprolol succinate XL (Toprol-XL) 25 mg 24 hr tablet; Take 0.5 tablets (12.5 mg) by mouth once daily.

## 2024-09-25 NOTE — ASSESSMENT & PLAN NOTE
"Concern for SCC left forearm- advised dermatology referral  She declines at this time, \"will consider it in 6 months\"          Other medical specialists are involved in patient's care.    Any recent notes that were available were reviewed.    All conditions are monitored, evaluated and assessed regularly.    Patient is compliant with current treatment regimen/medications.    "

## 2024-09-26 ENCOUNTER — OFFICE VISIT (OUTPATIENT)
Dept: CARDIOLOGY | Facility: CLINIC | Age: 84
End: 2024-09-26
Payer: MEDICARE

## 2024-09-26 ENCOUNTER — APPOINTMENT (OUTPATIENT)
Dept: HEMATOLOGY/ONCOLOGY | Facility: CLINIC | Age: 84
End: 2024-09-26
Payer: MEDICARE

## 2024-09-26 VITALS
OXYGEN SATURATION: 95 % | HEIGHT: 64 IN | DIASTOLIC BLOOD PRESSURE: 78 MMHG | BODY MASS INDEX: 23.6 KG/M2 | HEART RATE: 87 BPM | WEIGHT: 138.2 LBS | SYSTOLIC BLOOD PRESSURE: 133 MMHG

## 2024-09-26 DIAGNOSIS — I42.1 HYPERTROPHIC OBSTRUCTIVE CARDIOMYOPATHY (MULTI): ICD-10-CM

## 2024-09-26 DIAGNOSIS — I35.0 AORTIC STENOSIS, MODERATE: Primary | ICD-10-CM

## 2024-09-26 DIAGNOSIS — I25.10 CORONARY ARTERY DISEASE INVOLVING NATIVE CORONARY ARTERY OF NATIVE HEART WITHOUT ANGINA PECTORIS: ICD-10-CM

## 2024-09-26 PROCEDURE — 1123F ACP DISCUSS/DSCN MKR DOCD: CPT | Performed by: NURSE PRACTITIONER

## 2024-09-26 PROCEDURE — 1036F TOBACCO NON-USER: CPT | Performed by: NURSE PRACTITIONER

## 2024-09-26 PROCEDURE — 1159F MED LIST DOCD IN RCRD: CPT | Performed by: NURSE PRACTITIONER

## 2024-09-26 PROCEDURE — 1126F AMNT PAIN NOTED NONE PRSNT: CPT | Performed by: NURSE PRACTITIONER

## 2024-09-26 PROCEDURE — 99212 OFFICE O/P EST SF 10 MIN: CPT | Performed by: NURSE PRACTITIONER

## 2024-09-26 ASSESSMENT — PATIENT HEALTH QUESTIONNAIRE - PHQ9
2. FEELING DOWN, DEPRESSED OR HOPELESS: NOT AT ALL
SUM OF ALL RESPONSES TO PHQ9 QUESTIONS 1 AND 2: 0
1. LITTLE INTEREST OR PLEASURE IN DOING THINGS: NOT AT ALL

## 2024-09-26 ASSESSMENT — ENCOUNTER SYMPTOMS
DEPRESSION: 0
OCCASIONAL FEELINGS OF UNSTEADINESS: 0
LOSS OF SENSATION IN FEET: 0

## 2024-09-26 ASSESSMENT — PAIN SCALES - GENERAL: PAINLEVEL: 0-NO PAIN

## 2024-09-26 NOTE — PROGRESS NOTES
Giselle Escobar is a 84 y.o. female     History Of Present Illness   Mrs sEcobar is an 84 year old female with a PMH of CHRONIC A-fib (on Eliquis), aortic stenosis, Coronary artery disease, TIA/CVA.hypertension, chronic anemia, osteopenia and Stage III CKD, here for a routine follow up. She denies any complaints of chest pain, shortness of breath, palpitations, dizziness or syncope.  She does have mild lower extremity edema.  Social HX  Social History     Tobacco Use    Smoking status: Former     Current packs/day: 0.00     Average packs/day: 0.3 packs/day for 15.0 years (3.8 ttl pk-yrs)     Types: Cigarettes     Start date: 1975     Quit date: 1990     Years since quittin.7     Passive exposure: Never    Smokeless tobacco: Never   Substance Use Topics    Alcohol use: Not Currently    Drug use: Never          Family HX  Family History   Problem Relation Name Age of Onset    Diabetes Mother Dd     Heart attack Mother Dd     Heart attack Father      ALS Son            Review Of Systems   Constitutional: not feeling tired.   Eyes: no eyesight problems.  No vision loss or change in vision  ENT: no hearing loss and no nosebleeds.   Cardiovascular: No intermittent leg claudication,   No chest pain  No chest tightness   Np  heavy pressure  No shortness of breath,  No palpitations,  + Mild lower extremity edema  The heart rate is regular  Respiratory: no chronic cough   No shortness of breath.   Gastrointestinal: no change in bowel habits and no blood in stools.   Genitourinary: no urinary frequency.   Skin: no skin rashes.   Neurological: No frequent falls.   No dizziness  No weakness  Denies headaches  Psychiatric: no depression and not suicidal.   Musculoskeletal: No joint pain  No Myalgia pain       Allergies  Allergies   Allergen Reactions    Dye Hives     IV dye    Lisinopril Unknown    Morphine Other, Unknown and Hallucinations     dystonia    Iodinated Contrast Media Itching and Rash     per pt to [facility]  ct md td aware and spoke to radiolgist before scan done today          Vitals  There were no vitals taken for this visit.        Physical Exam  Constitutional: alert and in no acute distress.   Eyes: no erythema, swelling or discharge from the eye .   Neck: neck is supple, symmetric, trachea midline, no masses  and no thyromegaly .   Pulmonary: No increased work of breathing or signs of respiratory distress    Lungs clear to auscultation.    No friction rub.  Cardiovascular: carotid pulses 2+ bilaterally with no bruit    JVP was normal, no thrills ,   Irregular rhythm, normal S1 and S2, no murmurs    Pedal pulses 2+ bilaterally    Mild lower extremity edema .   Abdomen: abdomen non-tender, no masses  and no hepatomegaly . No pulsatile mass noted  Skin: skin warm and dry, normal skin turgor .   Psychiatric judgment and insight is normal  and oriented to person, place and time .         Current/Home Meds    Current Outpatient Medications:     ascorbic acid (Vitamin C) 500 mg chewable tablet, Chew 1 tablet (500 mg) once daily., Disp: , Rfl:     calcitriol (Rocaltrol) 0.25 mcg capsule, Take 1 capsule 3 times weekly, Disp: 45 capsule, Rfl: 3    calcium carbonate-vitamin D3 600 mg-10 mcg (400 unit) tablet, Take by mouth., Disp: , Rfl:     cyanocobalamin (Vitamin B-12) 1,000 mcg tablet, Take 100 mcg by mouth once daily., Disp: , Rfl:     ferrous sulfate 325 (65 Fe) MG EC tablet, Take 65 mg by mouth 2 times a day. Do not crush, chew, or split., Disp: , Rfl:     furosemide (Lasix) 20 mg tablet, Take 1 tablet (20 mg) by mouth once daily., Disp: , Rfl:     ipratropium-albuteroL (Duo-Neb) 0.5-2.5 mg/3 mL nebulizer solution, USE 3 ML VIA NEBULIZER EVERY 4 HOURS AS NEEDED FO SHORTNESS OF BREATH, Disp: , Rfl:     magnesium 250 mg tablet, Take 1 tablet (250 mg) by mouth., Disp: , Rfl:     metoprolol succinate XL (Toprol-XL) 25 mg 24 hr tablet, Take 0.5 tablets (12.5 mg) by mouth once daily., Disp: 45 tablet, Rfl: 3     mirtazapine (Remeron) 7.5 mg tablet, TAKE 1 TABLET BY MOUTH EVERY NIGHT AT BEDTIME FOR POOR APPETITE, Disp: , Rfl:     multivitamin tablet, once every 24 hours., Disp: , Rfl:     omeprazole (PriLOSEC) 20 mg DR capsule, TAKE 1 CAPSULE BY MOUTH ONCE  DAILY, Disp: 90 capsule, Rfl: 3    rosuvastatin (Crestor) 10 mg tablet, TAKE 1 TABLET BY MOUTH ONCE  DAILY, Disp: 90 tablet, Rfl: 1    Xarelto 15 mg tablet, TAKE 1 TABLET BY MOUTH DAILY, Disp: 90 tablet, Rfl: 3       Labs                 Cardiac Service Results:  CAROTID DUPLEX SCAN 4/24/24:  Right                       Left    PSV      EDV                PSV     EDV  54 cm/s            CCA P    46 cm/s  37 cm/s            CCA D    48 cm/s  70 cm/s  25 cm/s   ICA P    49 cm/s 20 cm/s  125 cm/s 43 cm/s   ICA M    81 cm/s 34 cm/s  61 cm/s  28 cm/s   ICA D    77 cm/s 27 cm/s  61 cm/s             ECA     36 cm/s  35 cm/s  11 cm/s Vertebral  37 cm/s 9 cm/s  139 cm/s         Subclavian 53 cm/s                   Right Left  ICA/CCA Ratio  1.9  1.0    Assessment/Plan      Carotid stenosis:  Most recent carotid duplex scan shows mild to mod disease in the right ICA.  She will need a repeat carotid duplex scan in one year.     HYPERTENSION:  BP is 133/78.  She is to continue losartan and monitor her BP at home.    A-FIB:  Patient remains in afib. She denies any complaints of dizziness, syncope or palpitations. Continue metoprolol and xarelto.

## 2024-10-11 DIAGNOSIS — K22.70 BARRETT'S ESOPHAGUS WITHOUT DYSPLASIA: ICD-10-CM

## 2024-10-12 RX ORDER — OMEPRAZOLE 20 MG/1
20 CAPSULE, DELAYED RELEASE ORAL DAILY
Qty: 90 CAPSULE | Refills: 1 | Status: SHIPPED | OUTPATIENT
Start: 2024-10-12

## 2024-10-17 ENCOUNTER — PATIENT OUTREACH (OUTPATIENT)
Dept: PRIMARY CARE | Facility: CLINIC | Age: 84
End: 2024-10-17
Payer: MEDICARE

## 2024-10-17 DIAGNOSIS — I48.0 PAROXYSMAL ATRIAL FIBRILLATION (MULTI): ICD-10-CM

## 2024-10-17 DIAGNOSIS — I27.0 PRIMARY PULMONARY HYPERTENSION (MULTI): ICD-10-CM

## 2024-10-17 DIAGNOSIS — I25.10 CORONARY ARTERY DISEASE INVOLVING NATIVE CORONARY ARTERY OF NATIVE HEART WITHOUT ANGINA PECTORIS: ICD-10-CM
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
## 2024-10-25 RX ORDER — HEPARIN SODIUM,PORCINE/PF 10 UNIT/ML
50 SYRINGE (ML) INTRAVENOUS AS NEEDED
OUTPATIENT
Start: 2024-10-25

## 2024-10-25 RX ORDER — HEPARIN 100 UNIT/ML
500 SYRINGE INTRAVENOUS AS NEEDED
OUTPATIENT
Start: 2024-10-25

## 2024-11-06 ENCOUNTER — PATIENT OUTREACH (OUTPATIENT)
Dept: PRIMARY CARE | Facility: CLINIC | Age: 84
End: 2024-11-06
Payer: MEDICARE

## 2024-11-06 DIAGNOSIS — I48.0 PAROXYSMAL ATRIAL FIBRILLATION (MULTI): ICD-10-CM

## 2024-11-06 DIAGNOSIS — R60.0 BILATERAL EDEMA OF LOWER EXTREMITY: ICD-10-CM

## 2024-11-06 NOTE — PROGRESS NOTES
Spoke with Giselle. She voiced she is having and increase of leg swelling. She verbalized that this happens when she is in [place]. Discussed with her and avelino to get her legs elevated above her heart to get the swelling out so she can put her support stockings on first thing in the morning.   Encouraged her to go to urgent care if she has opening of the skin. She will need to watch so the legs remain free from infection..  Talked about avoiding any food items that contain high salt content. She will make sure she watches .  Is taking her medications without issues or concerns. Will plan on follow-up.  She is not having any chest pain or any racing of the heart.

## 2024-11-14 NOTE — PROGRESS NOTES
Subjective   Patient ID: Giselle Escobar is a 84 y.o. female who presents for Cellulitis (Left lower leg, noticed it a few weeks ago /Went to the hospital in [place] had US done was told no blood clot /Was told she needed to be admitted she left the hospital /Started on Cephslrxin 500mg  4xs daily for 10 day /Has been on this for 6 days ).    HPI     Here today for concern for cellulitis-  She was in the ER in [place] on 11/7/24 for pain and swelling in LLE- reported was seeping and red in LLE x 2 weeks at that time.    U/S was neg for DVT.    They wanted to admit her for IV antibiotics but she refused.    She was given oral keflex qid x 10 days- started on Saturday.  She signed out AMA at that time.    Blood cultures neg at 5 days.   She states she has been taking the antibiotics since Saturday   Redness gone. Swelling improving.    Keeping legs wrapped.   No pain now.   No fever.   She has a hx of lower extremity cellulitis related to edema.    Current Outpatient Medications   Medication Sig Dispense Refill    ascorbic acid (Vitamin C) 500 mg chewable tablet Chew 1 tablet (500 mg) once daily.      calcitriol (Rocaltrol) 0.25 mcg capsule Take 1 capsule 3 times weekly 45 capsule 3    calcium carbonate-vitamin D3 600 mg-10 mcg (400 unit) tablet Take by mouth.      cyanocobalamin (Vitamin B-12) 1,000 mcg tablet Take 100 mcg by mouth once daily.      ferrous sulfate 325 (65 Fe) MG EC tablet Take 65 mg by mouth 2 times a day. Do not crush, chew, or split.      furosemide (Lasix) 20 mg tablet Take 1 tablet (20 mg) by mouth once daily.      ipratropium-albuteroL (Duo-Neb) 0.5-2.5 mg/3 mL nebulizer solution USE 3 ML VIA NEBULIZER EVERY 4 HOURS AS NEEDED FO SHORTNESS OF BREATH      magnesium 250 mg tablet Take 1 tablet (250 mg) by mouth.      metoprolol succinate XL (Toprol-XL) 25 mg 24 hr tablet Take 0.5 tablets (12.5 mg) by mouth once daily. 45 tablet 3    mirtazapine (Remeron) 7.5 mg tablet TAKE 1 TABLET BY MOUTH EVERY NIGHT  AT BEDTIME FOR POOR APPETITE      multivitamin tablet once every 24 hours.      omeprazole (PriLOSEC) 20 mg DR capsule TAKE 1 CAPSULE BY MOUTH ONCE  DAILY 90 capsule 1    rosuvastatin (Crestor) 10 mg tablet TAKE 1 TABLET BY MOUTH ONCE  DAILY 90 tablet 1    Xarelto 15 mg tablet TAKE 1 TABLET BY MOUTH DAILY 90 tablet 3    cephalexin (Keflex) 500 mg capsule Take 1 capsule (500 mg) by mouth 4 times a day for 4 days. 16 capsule 0     No current facility-administered medications for this visit.       Review of Systems   Constitutional:  Negative for chills and fever.   Respiratory:  Negative for shortness of breath.    Cardiovascular:  Positive for leg swelling. Negative for chest pain.   Skin:  Positive for color change. Negative for rash and wound.     Objective   /72 (BP Location: Left arm, Patient Position: Sitting, BP Cuff Size: Adult)   Pulse 96   Temp 36.1 °C (97 °F) (Temporal)   Resp 16   Wt 74.6 kg (164 lb 8 oz)   SpO2 96%   BMI 28.68 kg/m²     Physical Exam    Constitutional: Well developed, well nourished, alert and in no acute distress   Eyes: Normal external exam.   Cardiovascular: Irregular rate and rhythm, normal S1 and S2, no murmurs, gallops, or rubs. Radial pulses normal. Trace RLE edema, 1+ LLE edema with resolving faint erythema, some superficial skin peeling, no warmth or tenderness or drainage.   Pulmonary: No respiratory distress, lungs clear to auscultation bilaterally. No wheezes, rhonchi, rales.  Skin: Warm, well perfused, normal skin turgor and color.   Neurologic: Cranial nerves II-XII grossly intact.   Psychiatric: Mood calm and affect normal.      Assessment/Plan     Problem List Items Addressed This Visit       Paroxysmal atrial fibrillation (Multi)    Stage 4 chronic kidney disease (Multi)     Other Visit Diagnoses       Cellulitis of left lower extremity    -  Primary    Relevant Medications    cephalexin (Keflex) 500 mg capsule          Cellulitis is responding quite well to  oral keflex- will extend course for total 14 days.    Blood cultures were negative.   Return as needed.      Makenna Zafar,   11/15/2024

## 2024-11-15 ENCOUNTER — OFFICE VISIT (OUTPATIENT)
Dept: PRIMARY CARE | Facility: CLINIC | Age: 84
End: 2024-11-15
Payer: MEDICARE

## 2024-11-15 VITALS
DIASTOLIC BLOOD PRESSURE: 72 MMHG | WEIGHT: 164.5 LBS | OXYGEN SATURATION: 96 % | BODY MASS INDEX: 28.68 KG/M2 | HEART RATE: 96 BPM | TEMPERATURE: 97 F | RESPIRATION RATE: 16 BRPM | SYSTOLIC BLOOD PRESSURE: 136 MMHG

## 2024-11-15 DIAGNOSIS — I48.0 PAROXYSMAL ATRIAL FIBRILLATION (MULTI): ICD-10-CM

## 2024-11-15 DIAGNOSIS — L03.116 CELLULITIS OF LEFT LOWER EXTREMITY: Primary | ICD-10-CM

## 2024-11-15 DIAGNOSIS — N18.4 STAGE 4 CHRONIC KIDNEY DISEASE (MULTI): ICD-10-CM

## 2024-11-15 PROBLEM — L98.9 SKIN LESION OF LEFT ARM: Status: RESOLVED | Noted: 2024-09-25 | Resolved: 2024-11-15

## 2024-11-15 PROCEDURE — 1159F MED LIST DOCD IN RCRD: CPT | Performed by: FAMILY MEDICINE

## 2024-11-15 PROCEDURE — 1160F RVW MEDS BY RX/DR IN RCRD: CPT | Performed by: FAMILY MEDICINE

## 2024-11-15 PROCEDURE — 99214 OFFICE O/P EST MOD 30 MIN: CPT | Performed by: FAMILY MEDICINE

## 2024-11-15 PROCEDURE — 1123F ACP DISCUSS/DSCN MKR DOCD: CPT | Performed by: FAMILY MEDICINE

## 2024-11-15 PROCEDURE — 1036F TOBACCO NON-USER: CPT | Performed by: FAMILY MEDICINE

## 2024-11-15 RX ORDER — CEPHALEXIN 500 MG/1
CAPSULE ORAL
COMMUNITY
Start: 2024-11-08 | End: 2024-11-15 | Stop reason: SDUPTHER

## 2024-11-15 RX ORDER — CEPHALEXIN 500 MG/1
500 CAPSULE ORAL 4 TIMES DAILY
Qty: 16 CAPSULE | Refills: 0 | Status: SHIPPED | OUTPATIENT
Start: 2024-11-15 | End: 2024-11-19

## 2024-11-15 ASSESSMENT — ENCOUNTER SYMPTOMS
FEVER: 0
CHILLS: 0
WOUND: 0
COLOR CHANGE: 1
SHORTNESS OF BREATH: 0

## 2024-12-06 ENCOUNTER — PATIENT OUTREACH (OUTPATIENT)
Dept: PRIMARY CARE | Facility: CLINIC | Age: 84
End: 2024-12-06
Payer: MEDICARE

## 2024-12-06 DIAGNOSIS — I48.0 PAROXYSMAL ATRIAL FIBRILLATION (MULTI): ICD-10-CM

## 2024-12-06 DIAGNOSIS — K22.70 BARRETT'S ESOPHAGUS WITHOUT DYSPLASIA: ICD-10-CM

## 2024-12-06 NOTE — PROGRESS NOTES
Giselle is getting over a head cold. Discussed things she could do to support her body in recovering from her cold.  She is having some nausea in the am. Discussed possible need some crackers in the middle of the night. She will try.  No heart racing or shortness of breath.  She is able  to take her pills and obtain them without any issues. She will be hosting the holidays.  Encouraged her to reach out for anything.

## 2025-01-02 ENCOUNTER — DOCUMENTATION (OUTPATIENT)
Dept: PRIMARY CARE | Facility: CLINIC | Age: 85
End: 2025-01-02
Payer: MEDICARE

## 2025-01-03 ENCOUNTER — TELEPHONE (OUTPATIENT)
Dept: PRIMARY CARE | Facility: CLINIC | Age: 85
End: 2025-01-03
Payer: MEDICARE

## 2025-01-03 NOTE — TELEPHONE ENCOUNTER
Charissa from [facility] called in asking if they can get a signature on  Death Cert for patient.    She passed on 24       Cb [phone]

## 2025-01-03 NOTE — TELEPHONE ENCOUNTER
Called Rachel from [facility] let her know Dr. Patrick would sign

## 2025-04-14 ENCOUNTER — APPOINTMENT (OUTPATIENT)
Dept: PRIMARY CARE | Facility: CLINIC | Age: 85
End: 2025-04-14
Payer: MEDICARE

## 2025-04-15 ENCOUNTER — APPOINTMENT (OUTPATIENT)
Dept: HEMATOLOGY/ONCOLOGY | Facility: CLINIC | Age: 85
End: 2025-04-15
Payer: MEDICARE

## 2025-05-14 ENCOUNTER — APPOINTMENT (OUTPATIENT)
Dept: CARDIOLOGY | Facility: CLINIC | Age: 85
End: 2025-05-14
Payer: MEDICARE